# Patient Record
Sex: MALE | Race: WHITE | NOT HISPANIC OR LATINO | URBAN - METROPOLITAN AREA
[De-identification: names, ages, dates, MRNs, and addresses within clinical notes are randomized per-mention and may not be internally consistent; named-entity substitution may affect disease eponyms.]

---

## 2017-04-27 ENCOUNTER — ALLSCRIPTS OFFICE VISIT (OUTPATIENT)
Dept: OTHER | Facility: OTHER | Age: 57
End: 2017-04-27

## 2017-05-24 ENCOUNTER — ALLSCRIPTS OFFICE VISIT (OUTPATIENT)
Dept: OTHER | Facility: OTHER | Age: 57
End: 2017-05-24

## 2017-05-24 ENCOUNTER — HOSPITAL ENCOUNTER (OUTPATIENT)
Dept: RADIOLOGY | Facility: CLINIC | Age: 57
Discharge: HOME | End: 2017-05-24
Payer: COMMERCIAL

## 2017-05-24 DIAGNOSIS — M75.81 OTHER SHOULDER LESIONS, RIGHT SHOULDER: ICD-10-CM

## 2017-05-24 DIAGNOSIS — M25.519 PAIN IN SHOULDER: ICD-10-CM

## 2017-05-24 PROCEDURE — 73030 X-RAY EXAM OF SHOULDER: CPT

## 2017-10-26 ENCOUNTER — ALLSCRIPTS OFFICE VISIT (OUTPATIENT)
Dept: OTHER | Facility: OTHER | Age: 57
End: 2017-10-26

## 2017-10-27 NOTE — PROGRESS NOTES
Assessment  1  Depression (311) (F32 9)  2  Gastroesophageal reflux disease with esophagitis (530 11) (K21 0)  3  Allergic rhinitis, seasonal (477 9) (J30 2)    Plan  Depression    · Renew: ClonazePAM 0 5 MG Oral Tablet; take 1 tablet by mouth twice a day  Gastroesophageal reflux disease with esophagitis    · Renew: Omeprazole 40 MG Oral Capsule Delayed Release; take 1 capsule by mouth  once daily  Influenza vaccine needed    · Administer: Fluzone Quadrivalent Intramuscular Suspension; INJECT 0 5  ML  Intramuscular; To Be Done: [id number]      1 Amended By: Cristin Ying; Oct 26 2017 11:26 PM EST    Discussion/Summary    The patient is a 51-year-old male here for follow-up of anxiety and depression as well as gastroesophageal reflux disease  He feels that his symptoms haven't dramatically improved on Cymbalta  He also feels that it's helped for his chronic musculoskeletal pain  We will only increase his Cymbalta to 60 mg daily  He'll continue using clonazepam as needed  He'll continue with omeprazole for gastroesophageal reflux disease and loratadine for his allergic rhinitis  We'll see him back in 6 months or sooner as needed  He agrees  Chief Complaint  Patient is here today for follow up of chronic conditions described in HPI  History of Present Illness  The patient is a 51-year-old male here for follow-up of anxiety, depression as well as gastroesophageal reflux disease and allergic rhinitis  He was switched from Wellbutrin to Cymbalta  He states that since the switch she's feeling much better  Not only is his mood improved but his chronic musculoskeletal discomfort has significantly improved  He's been sleeping well and his anxiety level has improved as well  He does use Klonopin half a milligram in the morning and 1 mg in the evening for relief of his anxiety  His gastroesophageal reflux disease remains well-controlled and he takes his omeprazole daily   He's had no dysphagia or other worrisome symptoms  Allergies have been under fairly good control and continues with his loratadine daily  The patient is being seen for follow-up of gastroesophageal reflux disease  The patient reports doing well  Interval symptoms:  improved heartburn,-- denies chest pain,-- denies abdominal pain,-- improved acid regurgitation-- and-- denies dysphagia  Medications include omeprazole (Prilosec)  The patient states his depression has improved since the last visit  They have had recurrent episodes of major depression  Interval Symptoms: improved depression,-- improved depressed mood,-- improved loss of interest or pleasure in activities,-- denies insomnia,-- denies excessive sleepiness-- and-- improved loss of energy  Associated symptoms include: no recent weight gain,-- no recent weight loss-- and-- no thoughts of suicide  Review of Systems    Constitutional: no recent weight gain-- and-- no recent weight loss  Cardiovascular: No complaints of slow heart rate, no fast heart rate, no chest pain, no palpitations, no leg claudication, no lower extremity  Respiratory: No complaints of shortness of breath, no wheezing, no cough, no SOB on exertion, no orthopnea or PND  Gastrointestinal: as noted in HPI,-- no abdominal pain,-- no constipation-- and-- no diarrhea  Neurological: no headache-- and-- no dizziness  Psychiatric: anxiety-- and-- depression, but-- not suicidal       Active Problems  1  Actinic keratosis (702 0) (L57 0)  2  Allergic rhinitis, seasonal (477 9) (J30 2)  3  Degenerative arthritis of finger, right (715 94) (M19 041)  4  Depression (311) (F32 9)  5  Essential hypertriglyceridemia (272 1) (E78 1)  6  Gastroesophageal reflux disease with esophagitis (530 11) (K21 0)  7  Rotator cuff syndrome of right shoulder (726 10) (M75 101)    Social History   · Former smoker (H74 02) (O79 031)    Current Meds  1  ClonazePAM 0 5 MG Oral Tablet; take 1 tablet by mouth twice a day;    Therapy: 10ICH0947 to (Val Babb)  Requested for: 17YLB4610; Last   Rx:[id number] Ordered  2  DULoxetine HCl - 30 MG Oral Capsule Delayed Release Particles; TAKE 1 CAPSULE   Daily; Therapy: 16EDG1405 to (Evaluate:00Zxr6760)  Requested for: 66PLL2094; Last   Rx:[id number] Ordered  3  Loratadine 10 MG Oral Tablet; TAKE 1 TABLET DAILY; Therapy: 00Nyu4991 to (Last Rx:11Aug2016) Ordered  4  Omeprazole 40 MG Oral Capsule Delayed Release; take 1 capsule by mouth once daily    Requested for: 37Zfd5883; Last Rx:[id number] Ordered    Allergies  1  No Known Drug Allergies    Vitals  Vital Signs    Recorded: 93LWP9237 73:04HS   Systolic 808   Diastolic 82   Height 5 ft 8 5 in   Weight 201 lb    BMI Calculated 30 12   BSA Calculated 2 06     Physical Exam    Constitutional   General appearance: Abnormal  -- Overweight in no apparent distress  Pulmonary   Respiratory effort: No increased work of breathing or signs of respiratory distress  Auscultation of lungs: Clear to auscultation, equal breath sounds bilaterally, no wheezes, no rales, no rhonci  -- Clear to auscultation  Cardiovascular   Auscultation of heart: Normal rate and rhythm, normal S1 and S2, without murmurs  -- Regular rhythm with a rate in the 70s  Examination of extremities for edema and/or varicosities: Normal  -- No edema  Lymphatic   Palpation of lymph nodes in neck: No lymphadenopathy  -- No adenopathy  Musculoskeletal   Digits and nails: Normal without clubbing or cyanosis  -- No clubbing cyanosis or edema  Psychiatric   Orientation to person, place and time: Normal     Mood and affect: Normal          Future Appointments    Date/Time Provider Specialty Site   04/26/2018 04:00 PM PEREZ Regalado   Family Medicine [address] [place] [address]     Signatures   Electronically signed by : PEREZ Miller ; Oct 26 2017 11:24PM EST                       (Author)    Electronically signed by : PEREZ Miller ; Oct 26 2017 11:26PM EST (Author)

## 2018-01-12 VITALS
DIASTOLIC BLOOD PRESSURE: 82 MMHG | SYSTOLIC BLOOD PRESSURE: 130 MMHG | HEIGHT: 69 IN | BODY MASS INDEX: 29.77 KG/M2 | WEIGHT: 201 LBS

## 2018-01-12 VITALS
BODY MASS INDEX: 30.36 KG/M2 | SYSTOLIC BLOOD PRESSURE: 150 MMHG | WEIGHT: 205 LBS | TEMPERATURE: 97.7 F | DIASTOLIC BLOOD PRESSURE: 80 MMHG | HEIGHT: 69 IN

## 2018-01-12 NOTE — RESULT NOTES
Verified Results  COLONOSCOPY [id number] 12:00AM Aparnascarlet Quiñonestles     Test Name Result Flag Reference   Colonoscopy 02/17/2016

## 2018-01-14 VITALS
WEIGHT: 201.38 LBS | HEART RATE: 82 BPM | SYSTOLIC BLOOD PRESSURE: 129 MMHG | BODY MASS INDEX: 29.83 KG/M2 | HEIGHT: 69 IN | DIASTOLIC BLOOD PRESSURE: 76 MMHG

## 2018-01-18 NOTE — RESULT NOTES
Message   Please told him that overall his blood work looks good  His total cholesterol looks good as well as his LDL which is his bad cholesterol  His HDL/good cholesterol is a little bit low he should try to increase his aerobic exercise  Triglycerides/blood fat were somewhat elevated he should watch his diet for fatty food sugar etc  His blood sugar was normal      Verified Results  (1) LIPID PANEL, FASTING 15Sep2016 12:00AM Brandyn De Anda     Test Name Result Flag Reference   CHOLESTEROL, TOTAL 193 mg/dL  125-200   HDL CHOLESTEROL 37 mg/dL L > OR = 40   TRIGLICERIDES 780 mg/dL H <150   LDL-CHOLESTEROL 96 mg/dL (calc)  <130   Desirable range <100 mg/dL for patients with CHD or  diabetes and <70 mg/dL for diabetic patients with  known heart disease  CHOL/HDLC RATIO 5 2 (calc) H < OR = 5 0   NON HDL CHOLESTEROL 156 mg/dL (calc)     Target for non-HDL cholesterol is 30 mg/dL higher than   LDL cholesterol target  (1) COMPREHENSIVE METABOLIC PANEL [id number] 39:88YW Atif Baumann     Test Name Result Flag Reference   GLUCOSE 88 mg/dL  65-99   Fasting reference interval   UREA NITROGEN (BUN) 14 mg/dL  7-25   CREATININE 1 03 mg/dL  0 70-1 33   For patients >52years of age, the reference limit  for Creatinine is approximately 13% higher for people  identified as -American  eGFR NON-AFR   AMERICAN 81 mL/min/1 73m2  > OR = 60   eGFR AFRICAN AMERICAN 94 mL/min/1 73m2  > OR = 60   BUN/CREATININE RATIO   2-28   NOT APPLICABLE (calc)   SODIUM 140 mmol/L  135-146   POTASSIUM 4 6 mmol/L  3 5-5 3   CHLORIDE 105 mmol/L     CARBON DIOXIDE 27 mmol/L  20-31   CALCIUM 9 6 mg/dL  8 6-10 3   PROTEIN, TOTAL 7 0 g/dL  6 1-8 1   ALBUMIN 4 9 g/dL  3 6-5 1   GLOBULIN 2 1 g/dL (calc)  1 9-3 7   ALBUMIN/GLOBULIN RATIO 2 3 (calc)  1 0-2 5   BILIRUBIN, TOTAL 0 7 mg/dL  0 2-1 2   ALKALINE PHOSPHATASE 91 U/L     AST 19 U/L  10-35   ALT 29 U/L  9-46     (Q) CBC (H/H, RBC, INDICES, WBC, PLT) 15Sep2016 12:00AM Atif Baumann Test Name Result Flag Reference   WHITE BLOOD CELL COUNT 5 3 Thousand/uL  3 8-10 8   RED BLOOD CELL COUNT 4 71 Million/uL  4 20-5 80   HEMOGLOBIN 14 3 g/dL  13 2-17 1   HEMATOCRIT 42 1 %  38 5-50 0   MCV 89 3 fL  80 0-100 0   MCH 30 3 pg  27 0-33 0   MCHC 33 9 g/dL  32 0-36 0   RDW 13 2 %  11 0-15 0   PLATELET COUNT 307 Thousand/uL  140-400   MPV 7 9 fL  7 5-11 5     (Q) TSH, 3RD GENERATION [id number] 12:00AM Brandyn De Anda     Test Name Result Flag Reference   TSH 1 41 mIU/L  0 40-4 50

## 2018-04-26 ENCOUNTER — OFFICE VISIT (OUTPATIENT)
Dept: FAMILY MEDICINE CLINIC | Facility: CLINIC | Age: 58
End: 2018-04-26
Payer: COMMERCIAL

## 2018-04-26 VITALS
WEIGHT: 207 LBS | BODY MASS INDEX: 30.66 KG/M2 | DIASTOLIC BLOOD PRESSURE: 82 MMHG | HEIGHT: 69 IN | SYSTOLIC BLOOD PRESSURE: 128 MMHG

## 2018-04-26 DIAGNOSIS — F41.9 ANXIETY: ICD-10-CM

## 2018-04-26 DIAGNOSIS — F33.42 RECURRENT MAJOR DEPRESSIVE DISORDER, IN FULL REMISSION (HCC): Primary | ICD-10-CM

## 2018-04-26 DIAGNOSIS — K21.00 GASTROESOPHAGEAL REFLUX DISEASE WITH ESOPHAGITIS: ICD-10-CM

## 2018-04-26 PROBLEM — M75.101 ROTATOR CUFF SYNDROME OF RIGHT SHOULDER: Status: ACTIVE | Noted: 2017-04-27

## 2018-04-26 PROCEDURE — 99214 OFFICE O/P EST MOD 30 MIN: CPT | Performed by: FAMILY MEDICINE

## 2018-04-26 PROCEDURE — 3008F BODY MASS INDEX DOCD: CPT | Performed by: FAMILY MEDICINE

## 2018-04-26 RX ORDER — OMEPRAZOLE 40 MG/1
40 CAPSULE, DELAYED RELEASE ORAL DAILY
Qty: 30 CAPSULE | Refills: 5 | Status: SHIPPED | OUTPATIENT
Start: 2018-04-26 | End: 2018-11-01 | Stop reason: SDUPTHER

## 2018-04-26 RX ORDER — CLONAZEPAM 0.5 MG/1
1 TABLET ORAL 2 TIMES DAILY
COMMUNITY
Start: 2017-05-12 | End: 2018-04-26 | Stop reason: SDUPTHER

## 2018-04-26 RX ORDER — OMEPRAZOLE 40 MG/1
1 CAPSULE, DELAYED RELEASE ORAL DAILY
COMMUNITY
End: 2018-04-26 | Stop reason: SDUPTHER

## 2018-04-26 RX ORDER — CLONAZEPAM 0.5 MG/1
0.5 TABLET ORAL 2 TIMES DAILY
Qty: 60 TABLET | Refills: 5 | Status: SHIPPED | OUTPATIENT
Start: 2018-04-26 | End: 2018-11-01 | Stop reason: SDUPTHER

## 2018-04-26 RX ORDER — DULOXETIN HYDROCHLORIDE 60 MG/1
1 CAPSULE, DELAYED RELEASE ORAL DAILY
COMMUNITY
Start: 2017-04-27 | End: 2018-04-26 | Stop reason: SDUPTHER

## 2018-04-26 RX ORDER — DULOXETIN HYDROCHLORIDE 60 MG/1
60 CAPSULE, DELAYED RELEASE ORAL DAILY
Qty: 30 CAPSULE | Refills: 5 | Status: SHIPPED | OUTPATIENT
Start: 2018-04-26 | End: 2018-11-01 | Stop reason: SDUPTHER

## 2018-04-26 RX ORDER — BUPROPION HYDROCHLORIDE 75 MG/1
TABLET ORAL
COMMUNITY
Start: 2017-04-13 | End: 2018-04-26 | Stop reason: ALTCHOICE

## 2018-04-26 RX ORDER — LORATADINE 10 MG/1
1 TABLET ORAL AS NEEDED
COMMUNITY
Start: 2016-08-11

## 2018-04-26 NOTE — ASSESSMENT & PLAN NOTE
GERD symptoms are well controlled with omeprazole  He will continue it as if he tries to wean he has noted recurrence of symptoms

## 2018-04-26 NOTE — PROGRESS NOTES
Assessment/Plan:  Depression  He is doing well Cymbalta  Going to continue same  Anxiety  Anxiety well controlled with Klonopin which he will continue  Gastroesophageal reflux disease with esophagitis  GERD symptoms are well controlled with omeprazole  He will continue it as if he tries to wean he has noted recurrence of symptoms  Diagnoses and all orders for this visit:    Recurrent major depressive disorder, in full remission ([facility] [address] )  -     DULoxetine (CYMBALTA) 60 mg delayed release capsule; Take 1 capsule (60 mg total) by mouth daily    Gastroesophageal reflux disease with esophagitis  -     omeprazole (PriLOSEC) 40 MG capsule; Take 1 capsule (40 mg total) by mouth daily    Anxiety  -     clonazePAM (KlonoPIN) 0 5 mg tablet; Take 1 tablet (0 5 mg total) by mouth 2 (two) times a day    Other orders  -     Discontinue: buPROPion (WELLBUTRIN) 75 mg tablet;   -     Discontinue: clonazePAM (KlonoPIN) 0 5 mg tablet; Take 1 tablet by mouth 2 (two) times a day  -     Discontinue: DULoxetine (CYMBALTA) 60 mg delayed release capsule; Take 1 capsule by mouth daily  -     loratadine (CLARITIN) 10 mg tablet; Take 1 tablet by mouth daily  -     Discontinue: omeprazole (PriLOSEC) 40 MG capsule; Take 1 capsule by mouth daily          Subjective:   Chief Complaint   Patient presents with    Follow-up     6 Mo Med Check        Patient ID: Nicole Hernandez is a 62 y o  male  Things are going well  Cymbalta is feeling well  Mood is good  Reflux recurs if stops  Uses Klonopin  1/2 am and full PM  C/w Cymbalta  No SE  The best I have felt in years and years  Sleep good, interest good, energy level +/-, concentration good, appetite good  No suicidal ideation  HPI  The patient is a 71-year-old male here for follow-up of multiple medical problems including anxiety and depression as well as gastroesophageal reflux disease  He states that he has been doing well since his last visit  Cymbalta has worked well for him and he states that this is the best he has felt in many years  He continues to use Klonopin a half of a 0 5 mg tablet 1st thing in the morning and then 1 after he returns from work for the day  States his anxiety level has improved overall  He continues with omeprazole for gastroesophageal reflux disease  It works well his symptoms are well controlled he takes it but if he tries to discontinue it he notes that symptoms recur  He has had no dysphagia or other concerning symptoms such as weight loss night sweats X cetera  The following portions of the patient's history were reviewed and updated as appropriate: allergies, past family history, past medical history, past social history, past surgical history and problem list     Review of Systems   Constitution: Negative for decreased appetite and weight loss  Eyes: Negative for double vision  Cardiovascular: Negative for chest pain, leg swelling, orthopnea and paroxysmal nocturnal dyspnea  Gastrointestinal: Negative for constipation, diarrhea, dysphagia and hematemesis  Genitourinary: Positive for nocturia  Negative for dysuria  Urinary slowing      Neurological: Negative for headaches and light-headedness  Psychiatric/Behavioral: Positive for depression  The patient is nervous/anxious  The patient does not have insomnia  Objective:    Physical Exam   Constitutional: He is oriented to person, place, and time  He appears well-developed and well-nourished  Overweight in no apparent distress   Neck: No thyromegaly present  Cardiovascular: Normal rate and regular rhythm  Pulmonary/Chest: Effort normal and breath sounds normal    Abdominal: Soft  Bowel sounds are normal    Neurological: He is alert and oriented to person, place, and time  Psychiatric: He has a normal mood and affect  His behavior is normal    Does not bear anxious nor depressed today

## 2018-07-02 ENCOUNTER — OFFICE VISIT (OUTPATIENT)
Dept: FAMILY MEDICINE CLINIC | Facility: CLINIC | Age: 58
End: 2018-07-02
Payer: COMMERCIAL

## 2018-07-02 VITALS
DIASTOLIC BLOOD PRESSURE: 70 MMHG | BODY MASS INDEX: 30.35 KG/M2 | SYSTOLIC BLOOD PRESSURE: 120 MMHG | WEIGHT: 207 LBS | TEMPERATURE: 98.1 F

## 2018-07-02 DIAGNOSIS — M70.41 PREPATELLAR BURSITIS OF RIGHT KNEE: Primary | ICD-10-CM

## 2018-07-02 PROCEDURE — 99214 OFFICE O/P EST MOD 30 MIN: CPT | Performed by: FAMILY MEDICINE

## 2018-07-02 RX ORDER — DOXYCYCLINE HYCLATE 100 MG/1
100 CAPSULE ORAL EVERY 12 HOURS SCHEDULED
Qty: 20 CAPSULE | Refills: 1 | Status: SHIPPED | OUTPATIENT
Start: 2018-07-02 | End: 2018-07-12

## 2018-07-02 NOTE — ASSESSMENT & PLAN NOTE
The patient appears to have a right prepatellar bursitis  There is no fluctuance  There is no drainage  There is no knee effusion  This may be inflammatory in nature though could not rule out infectious  I do not believe it is drainable  We asked patient to use warm compresses and we are going to start him on doxycycline 100 milligrams b i d  for 10 days with 1 refill  He is asked to call in 3 days if he does not see significant improvement in the knee  Will call sooner as needed  He agrees

## 2018-07-02 NOTE — PATIENT INSTRUCTIONS
Please use warm compresses to the area  Please take all of the doxycycline  Please take precautions while in the sun to avoid sun exposure

## 2018-07-02 NOTE — PROGRESS NOTES
Assessment/Plan:  Prepatellar bursitis of right knee  The patient appears to have a right prepatellar bursitis  There is no fluctuance  There is no drainage  There is no knee effusion  This may be inflammatory in nature though could not rule out infectious  I do not believe it is drainable  We asked patient to use warm compresses and we are going to start him on doxycycline 100 milligrams b i d  for 10 days with 1 refill  He is asked to call in 3 days if he does not see significant improvement in the knee  Will call sooner as needed  He agrees  Diagnoses and all orders for this visit:    Prepatellar bursitis of right knee  -     doxycycline hyclate (VIBRAMYCIN) 100 mg capsule; Take 1 capsule (100 mg total) by mouth every 12 (twelve) hours for 10 days          Subjective:   Chief Complaint   Patient presents with    Insect Bite     1 week ago tomorrow woke up with red swollen right knee  Patient ID: Anusha Daniels is a 62 y o  male  HPI  The patient is a 61-year-old male [place] who states that approximately 1 week ago he woke up with uncomfortable swollen right knee  He is concerned that it may have been a tick bite due to the circular nature of the affected area though he recalls seeing no tick  He has had no headache neck pain fevers chills or constitutional symptoms  He has no limitation of range of motion in the knee  He has no other joints affected  He has no rapid or irregular heartbeats  His father was recently treated for Lyme disease and had a difficult time he is significantly concerned about the possibility that he may have Lyme disease  The following portions of the patient's history were reviewed and updated as appropriate: allergies, current medications, past family history, past medical history, past social history and problem list     Review of Systems   Constitution: Negative for decreased appetite, fever and malaise/fatigue  Cardiovascular: Negative  Negative for irregular heartbeat, leg swelling and palpitations  Respiratory: Negative  Musculoskeletal: Positive for joint swelling  Negative for neck pain  Swelling and warmth of the right prepatellar region   Gastrointestinal: Negative  Genitourinary: Negative  Neurological: Negative for dizziness and headaches  Psychiatric/Behavioral: Negative for altered mental status and depression  The patient does not have insomnia and is not nervous/anxious  Objective:    Physical Exam   Constitutional: He is oriented to person, place, and time  He appears well-developed and well-nourished  Neck: Neck supple  No JVD present  No thyromegaly present  Cardiovascular: Normal rate, regular rhythm and normal heart sounds  No murmur heard  Pulmonary/Chest: Effort normal and breath sounds normal  No respiratory distress  He has no wheezes  Musculoskeletal: He exhibits tenderness  He exhibits no edema  There is erythema and induration of the right prepatellar region especially the mid inferior pole  There is no drainage  Range of motion in the is normal and there is no knee effusion  Lymphadenopathy:     He has no cervical adenopathy  Neurological: He is alert and oriented to person, place, and time  Skin: There is erythema  There is erythema and warmth of the right prepatellar region  There is some induration of the mid and lower pole of the prepatellar region  There does appear to be what may be a pinpoint entry area in the center of this region  There is no foreign body noted  Cannot state that this is an insect bite  There is no ECM

## 2018-11-01 ENCOUNTER — OFFICE VISIT (OUTPATIENT)
Dept: FAMILY MEDICINE CLINIC | Facility: CLINIC | Age: 58
End: 2018-11-01
Payer: COMMERCIAL

## 2018-11-01 VITALS
TEMPERATURE: 98.1 F | SYSTOLIC BLOOD PRESSURE: 126 MMHG | BODY MASS INDEX: 30.35 KG/M2 | OXYGEN SATURATION: 96 % | DIASTOLIC BLOOD PRESSURE: 82 MMHG | HEART RATE: 95 BPM | WEIGHT: 207 LBS

## 2018-11-01 DIAGNOSIS — Z23 NEED FOR INFLUENZA VACCINATION: ICD-10-CM

## 2018-11-01 DIAGNOSIS — F41.9 ANXIETY: ICD-10-CM

## 2018-11-01 DIAGNOSIS — J30.1 SEASONAL ALLERGIC RHINITIS DUE TO POLLEN: ICD-10-CM

## 2018-11-01 DIAGNOSIS — K21.00 GASTROESOPHAGEAL REFLUX DISEASE WITH ESOPHAGITIS: ICD-10-CM

## 2018-11-01 DIAGNOSIS — E78.1 ESSENTIAL HYPERTRIGLYCERIDEMIA: ICD-10-CM

## 2018-11-01 DIAGNOSIS — F33.42 RECURRENT MAJOR DEPRESSIVE DISORDER, IN FULL REMISSION (HCC): Primary | ICD-10-CM

## 2018-11-01 PROCEDURE — 90471 IMMUNIZATION ADMIN: CPT

## 2018-11-01 PROCEDURE — 1036F TOBACCO NON-USER: CPT | Performed by: FAMILY MEDICINE

## 2018-11-01 PROCEDURE — 90682 RIV4 VACC RECOMBINANT DNA IM: CPT

## 2018-11-01 PROCEDURE — 99214 OFFICE O/P EST MOD 30 MIN: CPT | Performed by: FAMILY MEDICINE

## 2018-11-01 RX ORDER — OMEPRAZOLE 40 MG/1
40 CAPSULE, DELAYED RELEASE ORAL DAILY
Qty: 30 CAPSULE | Refills: 5 | Status: SHIPPED | OUTPATIENT
Start: 2018-11-01 | End: 2019-04-26 | Stop reason: SDUPTHER

## 2018-11-01 RX ORDER — DULOXETIN HYDROCHLORIDE 60 MG/1
60 CAPSULE, DELAYED RELEASE ORAL DAILY
Qty: 30 CAPSULE | Refills: 5 | Status: SHIPPED | OUTPATIENT
Start: 2018-11-01 | End: 2019-04-26 | Stop reason: SDUPTHER

## 2018-11-01 RX ORDER — CLONAZEPAM 0.5 MG/1
0.5 TABLET ORAL 2 TIMES DAILY
Qty: 60 TABLET | Refills: 5 | Status: SHIPPED | OUTPATIENT
Start: 2018-11-01 | End: 2019-04-26 | Stop reason: SDUPTHER

## 2018-11-01 NOTE — PROGRESS NOTES
Assessment/Plan:  Gastroesophageal reflux disease with esophagitis  Continue with omeprazole    Allergic rhinitis, seasonal  Continue with loratadine    Depression  Continue with duloxetine  Well controlled    Anxiety  Continue with clonazepam     Essential hypertriglyceridemia  He is overdue for blood work and he will have fasting lipids performed  Return for fasting blood work to include CBC CMP lipids and TSH     Diagnoses and all orders for this visit:    Recurrent major depressive disorder, in full remission ([facility] [address] )  -     DULoxetine (CYMBALTA) 60 mg delayed release capsule; Take 1 capsule (60 mg total) by mouth daily    Anxiety  -     clonazePAM (KlonoPIN) 0 5 mg tablet; Take 1 tablet (0 5 mg total) by mouth 2 (two) times a day    Gastroesophageal reflux disease with esophagitis  -     omeprazole (PriLOSEC) 40 MG capsule; Take 1 capsule (40 mg total) by mouth daily    Seasonal allergic rhinitis due to pollen    Need for influenza vaccination  -     influenza vaccine, [id number], quadrivalent, recombinant, PF, 0 5 mL, for patients 18 yr+ (FLUBLOK)    Essential hypertriglyceridemia          Subjective:   Chief Complaint   Patient presents with    med check     here for his 6 mth med refills  he will get his flu shot today  he will schedule for fbw  last colon was 2/16   Daughter doing great but wife has breast cancer  Seems to be feeling well  " Duloxetine works terrific "  I dont hurt as much  Reflux symptoms up and down  C/w omeprazole  No dysphagia  AR sx  Not too bad  Takes loratadine  Klonopin BID  Sleep good as well as interest, energy good, concentration OK, appetite good  No suicidal ideation  Patient ID: Power Choudhury is a 62 y o  male  HPI  Patient is a 49-year-old male a history of anxiety and depression as well as gastroesophageal reflux disease and allergic rhinitis  He states that overall he is feeling well    He feels that his depression and anxiety are very well controlled with the duloxetine and Klonopin  He states that his wife was recently diagnosed with triple negative breast cancer  He has been able handle the news well per his assessment  His daughters lupus has been much improved which he is happy about  He does need to continue to take his omeprazole  He states his reflux symptoms wax and wane even with regular use of 40 mg of omeprazole  He does not get dysphagia anorexia weight loss night sweats X cetera  His allergic rhinitis is fairly well controlled as long as he takes loratadine  States he has been sleeping well, interest level is good, energy is good as well as concentration  He has no issues with his appetite he has no suicidal ideation  The following portions of the patient's history were reviewed and updated as appropriate: allergies, current medications, past family history, past medical history, past social history, past surgical history and problem list     Review of Systems   Constitution: Negative for chills, decreased appetite, fever, malaise/fatigue, weight gain and weight loss  HENT: Negative for congestion  Cardiovascular: Negative for chest pain, dyspnea on exertion, leg swelling, orthopnea and paroxysmal nocturnal dyspnea  Respiratory: Negative for cough, shortness of breath, sputum production and wheezing  Endocrine: Negative for cold intolerance, heat intolerance, polydipsia, polyphagia and polyuria  Gastrointestinal: Positive for heartburn  Negative for abdominal pain, bowel incontinence, constipation, diarrhea and dysphagia  Genitourinary: Negative for bladder incontinence, frequency and hematuria  Neurological: Negative for dizziness, headaches and light-headedness  Psychiatric/Behavioral: Positive for depression  Negative for suicidal ideas  The patient is nervous/anxious  The patient does not have insomnia  Objective:    Physical Exam   Constitutional: He is oriented to person, place, and time   He appears well-developed and well-nourished  No distress  Neck: Neck supple  No JVD present  No thyromegaly present  Cardiovascular: Normal rate, regular rhythm and normal heart sounds  Exam reveals no gallop  No murmur heard  Pulmonary/Chest: Effort normal and breath sounds normal  No respiratory distress  He has no wheezes  He has no rales  Musculoskeletal: He exhibits no edema  Neurological: He is alert and oriented to person, place, and time  Psychiatric: He has a normal mood and affect   His behavior is normal  Thought content normal

## 2018-12-21 ENCOUNTER — CLINICAL SUPPORT (OUTPATIENT)
Dept: FAMILY MEDICINE CLINIC | Facility: CLINIC | Age: 58
End: 2018-12-21
Payer: COMMERCIAL

## 2018-12-21 DIAGNOSIS — Z11.59 NEED FOR HEPATITIS C SCREENING TEST: Primary | ICD-10-CM

## 2018-12-21 DIAGNOSIS — Z13.1 SCREENING FOR DIABETES MELLITUS: ICD-10-CM

## 2018-12-21 DIAGNOSIS — E78.1 ESSENTIAL HYPERTRIGLYCERIDEMIA: ICD-10-CM

## 2018-12-21 DIAGNOSIS — F33.42 RECURRENT MAJOR DEPRESSIVE DISORDER, IN FULL REMISSION (HCC): ICD-10-CM

## 2018-12-21 DIAGNOSIS — R53.83 FATIGUE, UNSPECIFIED TYPE: ICD-10-CM

## 2018-12-21 PROCEDURE — 36415 COLL VENOUS BLD VENIPUNCTURE: CPT

## 2018-12-24 LAB
ALBUMIN SERPL-MCNC: 4.3 G/DL (ref 3.6–5.1)
ALBUMIN/GLOB SERPL: 2.4 (CALC) (ref 1–2.5)
ALP SERPL-CCNC: 98 U/L (ref 40–115)
ALT SERPL-CCNC: 40 U/L (ref 9–46)
AST SERPL-CCNC: 20 U/L (ref 10–35)
B BURGDOR AB SER IA-ACNC: <0.9 INDEX
BILIRUB SERPL-MCNC: 0.4 MG/DL (ref 0.2–1.2)
BUN SERPL-MCNC: 17 MG/DL (ref 7–25)
BUN/CREAT SERPL: ABNORMAL (CALC) (ref 6–22)
CALCIUM SERPL-MCNC: 8.9 MG/DL (ref 8.6–10.3)
CHLORIDE SERPL-SCNC: 107 MMOL/L (ref 98–110)
CHOLEST SERPL-MCNC: 183 MG/DL
CHOLEST/HDLC SERPL: 5.2 (CALC)
CO2 SERPL-SCNC: 25 MMOL/L (ref 20–32)
CREAT SERPL-MCNC: 0.88 MG/DL (ref 0.7–1.33)
ERYTHROCYTE [DISTWIDTH] IN BLOOD BY AUTOMATED COUNT: 13 % (ref 11–15)
GLOBULIN SER CALC-MCNC: 1.8 G/DL (CALC) (ref 1.9–3.7)
GLUCOSE SERPL-MCNC: 98 MG/DL (ref 65–99)
HCT VFR BLD AUTO: 41.6 % (ref 38.5–50)
HCV AB S/CO SERPL IA: 0.01
HCV AB SERPL QL IA: NORMAL
HDLC SERPL-MCNC: 35 MG/DL
HGB BLD-MCNC: 14 G/DL (ref 13.2–17.1)
LDLC SERPL CALC-MCNC: 116 MG/DL (CALC)
MCH RBC QN AUTO: 30.4 PG (ref 27–33)
MCHC RBC AUTO-ENTMCNC: 33.7 G/DL (ref 32–36)
MCV RBC AUTO: 90.2 FL (ref 80–100)
NONHDLC SERPL-MCNC: 148 MG/DL (CALC)
PLATELET # BLD AUTO: 263 THOUSAND/UL (ref 140–400)
PMV BLD REES-ECKER: 9.7 FL (ref 7.5–12.5)
POTASSIUM SERPL-SCNC: 4.4 MMOL/L (ref 3.5–5.3)
PROT SERPL-MCNC: 6.1 G/DL (ref 6.1–8.1)
RBC # BLD AUTO: 4.61 MILLION/UL (ref 4.2–5.8)
SL AMB EGFR AFRICAN AMERICAN: 110 ML/MIN/1.73M2
SL AMB EGFR NON AFRICAN AMERICAN: 95 ML/MIN/1.73M2
SODIUM SERPL-SCNC: 141 MMOL/L (ref 135–146)
TRIGL SERPL-MCNC: 208 MG/DL
TSH SERPL-ACNC: 0.88 MIU/L (ref 0.4–4.5)
WBC # BLD AUTO: 4.4 THOUSAND/UL (ref 3.8–10.8)

## 2019-01-31 ENCOUNTER — OFFICE VISIT (OUTPATIENT)
Dept: FAMILY MEDICINE CLINIC | Facility: CLINIC | Age: 59
End: 2019-01-31
Payer: COMMERCIAL

## 2019-01-31 VITALS
HEIGHT: 69 IN | DIASTOLIC BLOOD PRESSURE: 78 MMHG | BODY MASS INDEX: 31.4 KG/M2 | HEART RATE: 72 BPM | TEMPERATURE: 96.8 F | SYSTOLIC BLOOD PRESSURE: 122 MMHG | OXYGEN SATURATION: 96 % | WEIGHT: 212 LBS

## 2019-01-31 DIAGNOSIS — L57.0 KERATOSIS: Primary | ICD-10-CM

## 2019-01-31 PROCEDURE — 99213 OFFICE O/P EST LOW 20 MIN: CPT | Performed by: FAMILY MEDICINE

## 2019-01-31 PROCEDURE — 3008F BODY MASS INDEX DOCD: CPT | Performed by: FAMILY MEDICINE

## 2019-01-31 PROCEDURE — 17000 DESTRUCT PREMALG LESION: CPT | Performed by: FAMILY MEDICINE

## 2019-01-31 PROCEDURE — 1036F TOBACCO NON-USER: CPT | Performed by: FAMILY MEDICINE

## 2019-01-31 NOTE — ASSESSMENT & PLAN NOTE
This keratotic lesion was treated with cryotherapy  I told the patient I believe that represents seborrheic keratosis but I am not 100% sure this  He is asked to observe for resolution in 2 weeks  If there is any remaining lesion at the end of this 2 week  He should come for re-evaluation  He agrees

## 2019-01-31 NOTE — PROGRESS NOTES
Assessment/Plan:  Keratosis  This keratotic lesion was treated with cryotherapy  I told the patient I believe that represents seborrheic keratosis but I am not 100% sure this  He is asked to observe for resolution in 2 weeks  If there is any remaining lesion at the end of this 2 week  He should come for re-evaluation  He agrees  Diagnoses and all orders for this visit:    Keratosis    Other orders  -     Lesion Destruction          Subjective:   Chief Complaint   Patient presents with    skin check     here to have a spot on his L shoulder checked  states it is black  Patient ID: Ekaterina Felix is a 62 y o  male  HPI  The patient is a 79-year-old male who presents today to have a lesion on his left shoulder checked  It is dark in coloration  He recently accompanied his wife to 1 of her oncology visits and PA told him that he should have it checked  He has no personal history of skin malignancy and he is unsure of family history  He states that his father brother both have had things frozen off but he is not aware of melanoma X cetera  He has had no constitutional symptoms such as fevers chills weight loss X cetera  The following portions of the patient's history were reviewed and updated as appropriate: allergies, current medications, past family history, past medical history, past social history, past surgical history and problem list     Review of Systems   Constitution: Negative for chills, decreased appetite, fever, weight gain and weight loss  Skin: Positive for color change and suspicious lesions  Negative for dry skin, itching and skin cancer  Objective:    Physical Exam   Constitutional: He is oriented to person, place, and time  He appears well-developed and well-nourished  Neurological: He is alert and oriented to person, place, and time  Skin:   Left scapular region, laterally with a 5 mm raised brown somewhat for verrucous/crusty appearing lesion    It is regular in color and borders  It appears to represent a seborrheic keratosis  Could not rule out actinic keratosis  Psychiatric: He has a normal mood and affect  Nursing note and vitals reviewed  Lesion Destruction  Date/Time: 1/31/2019 4:52 PM  Performed by: Juwan Coombs  Authorized by: Juwan Coombs     Procedure Details - Lesion Destruction:     Number of Lesions:  1  Lesion 1:     Body area:  Trunk    Trunk location:  Chest    Initial size (mm):  5    Malignancy: benign hyperkeratotic lesion      Destruction method: cryotherapy    Lesion 6:      Cryotherapy was used to treat this likely seborrheic keratosis times 45 seconds x2 freeze thaw cycles

## 2019-04-26 DIAGNOSIS — F41.9 ANXIETY: ICD-10-CM

## 2019-04-26 DIAGNOSIS — F33.42 RECURRENT MAJOR DEPRESSIVE DISORDER, IN FULL REMISSION (HCC): ICD-10-CM

## 2019-04-26 DIAGNOSIS — K21.00 GASTROESOPHAGEAL REFLUX DISEASE WITH ESOPHAGITIS: ICD-10-CM

## 2019-04-26 RX ORDER — OMEPRAZOLE 40 MG/1
40 CAPSULE, DELAYED RELEASE ORAL DAILY
Qty: 30 CAPSULE | Refills: 5 | Status: SHIPPED | OUTPATIENT
Start: 2019-04-26 | End: 2019-05-23 | Stop reason: SDUPTHER

## 2019-04-26 RX ORDER — CLONAZEPAM 0.5 MG/1
0.5 TABLET ORAL 2 TIMES DAILY
Qty: 60 TABLET | Refills: 5 | Status: SHIPPED | OUTPATIENT
Start: 2019-04-26 | End: 2019-05-23 | Stop reason: SDUPTHER

## 2019-04-26 RX ORDER — DULOXETIN HYDROCHLORIDE 60 MG/1
60 CAPSULE, DELAYED RELEASE ORAL DAILY
Qty: 30 CAPSULE | Refills: 5 | Status: SHIPPED | OUTPATIENT
Start: 2019-04-26 | End: 2019-05-23 | Stop reason: SDUPTHER

## 2019-05-23 ENCOUNTER — OFFICE VISIT (OUTPATIENT)
Dept: FAMILY MEDICINE CLINIC | Facility: CLINIC | Age: 59
End: 2019-05-23
Payer: COMMERCIAL

## 2019-05-23 VITALS
BODY MASS INDEX: 29.62 KG/M2 | HEART RATE: 78 BPM | HEIGHT: 69 IN | TEMPERATURE: 98 F | WEIGHT: 200 LBS | SYSTOLIC BLOOD PRESSURE: 115 MMHG | OXYGEN SATURATION: 97 % | DIASTOLIC BLOOD PRESSURE: 72 MMHG

## 2019-05-23 DIAGNOSIS — K21.00 GASTROESOPHAGEAL REFLUX DISEASE WITH ESOPHAGITIS: ICD-10-CM

## 2019-05-23 DIAGNOSIS — F41.9 ANXIETY: ICD-10-CM

## 2019-05-23 DIAGNOSIS — F33.42 RECURRENT MAJOR DEPRESSIVE DISORDER, IN FULL REMISSION (HCC): ICD-10-CM

## 2019-05-23 PROCEDURE — 99214 OFFICE O/P EST MOD 30 MIN: CPT | Performed by: FAMILY MEDICINE

## 2019-05-23 PROCEDURE — 3008F BODY MASS INDEX DOCD: CPT | Performed by: FAMILY MEDICINE

## 2019-05-23 PROCEDURE — 1036F TOBACCO NON-USER: CPT | Performed by: FAMILY MEDICINE

## 2019-05-23 RX ORDER — CLONAZEPAM 0.5 MG/1
0.5 TABLET ORAL 2 TIMES DAILY
Qty: 60 TABLET | Refills: 5 | Status: SHIPPED | OUTPATIENT
Start: 2019-05-23 | End: 2019-11-19 | Stop reason: SDUPTHER

## 2019-05-23 RX ORDER — DULOXETIN HYDROCHLORIDE 60 MG/1
60 CAPSULE, DELAYED RELEASE ORAL DAILY
Qty: 30 CAPSULE | Refills: 5 | Status: SHIPPED | OUTPATIENT
Start: 2019-05-23 | End: 2019-11-19 | Stop reason: SDUPTHER

## 2019-05-23 RX ORDER — OMEPRAZOLE 40 MG/1
40 CAPSULE, DELAYED RELEASE ORAL DAILY
Qty: 30 CAPSULE | Refills: 5 | Status: SHIPPED | OUTPATIENT
Start: 2019-05-23 | End: 2019-11-19 | Stop reason: SDUPTHER

## 2019-11-19 DIAGNOSIS — F41.9 ANXIETY: ICD-10-CM

## 2019-11-19 DIAGNOSIS — F33.42 RECURRENT MAJOR DEPRESSIVE DISORDER, IN FULL REMISSION (HCC): ICD-10-CM

## 2019-11-19 DIAGNOSIS — K21.00 GASTROESOPHAGEAL REFLUX DISEASE WITH ESOPHAGITIS: ICD-10-CM

## 2019-11-19 RX ORDER — DULOXETIN HYDROCHLORIDE 60 MG/1
60 CAPSULE, DELAYED RELEASE ORAL DAILY
Qty: 30 CAPSULE | Refills: 1 | Status: SHIPPED | OUTPATIENT
Start: 2019-11-19 | End: 2019-12-16 | Stop reason: SDUPTHER

## 2019-11-19 RX ORDER — OMEPRAZOLE 40 MG/1
40 CAPSULE, DELAYED RELEASE ORAL DAILY
Qty: 30 CAPSULE | Refills: 1 | Status: SHIPPED | OUTPATIENT
Start: 2019-11-19 | End: 2019-12-16 | Stop reason: ALTCHOICE

## 2019-11-19 RX ORDER — CLONAZEPAM 0.5 MG/1
0.5 TABLET ORAL 2 TIMES DAILY
Qty: 60 TABLET | Refills: 1 | Status: SHIPPED | OUTPATIENT
Start: 2019-11-19 | End: 2019-12-16 | Stop reason: SDUPTHER

## 2019-12-16 ENCOUNTER — OFFICE VISIT (OUTPATIENT)
Dept: FAMILY MEDICINE CLINIC | Facility: CLINIC | Age: 59
End: 2019-12-16
Payer: COMMERCIAL

## 2019-12-16 VITALS
HEIGHT: 69 IN | HEART RATE: 76 BPM | BODY MASS INDEX: 30.81 KG/M2 | WEIGHT: 208 LBS | OXYGEN SATURATION: 97 % | DIASTOLIC BLOOD PRESSURE: 68 MMHG | TEMPERATURE: 96.9 F | SYSTOLIC BLOOD PRESSURE: 112 MMHG

## 2019-12-16 DIAGNOSIS — E78.1 ESSENTIAL HYPERTRIGLYCERIDEMIA: Primary | ICD-10-CM

## 2019-12-16 DIAGNOSIS — F41.9 ANXIETY: ICD-10-CM

## 2019-12-16 DIAGNOSIS — F33.42 RECURRENT MAJOR DEPRESSIVE DISORDER, IN FULL REMISSION (HCC): ICD-10-CM

## 2019-12-16 DIAGNOSIS — Z23 ENCOUNTER FOR IMMUNIZATION: ICD-10-CM

## 2019-12-16 PROCEDURE — 99214 OFFICE O/P EST MOD 30 MIN: CPT | Performed by: FAMILY MEDICINE

## 2019-12-16 PROCEDURE — 90682 RIV4 VACC RECOMBINANT DNA IM: CPT

## 2019-12-16 PROCEDURE — 3008F BODY MASS INDEX DOCD: CPT | Performed by: FAMILY MEDICINE

## 2019-12-16 PROCEDURE — 90471 IMMUNIZATION ADMIN: CPT

## 2019-12-16 PROCEDURE — 1036F TOBACCO NON-USER: CPT | Performed by: FAMILY MEDICINE

## 2019-12-16 RX ORDER — CLONAZEPAM 0.5 MG/1
0.5 TABLET ORAL 2 TIMES DAILY
Qty: 60 TABLET | Refills: 5 | Status: SHIPPED | OUTPATIENT
Start: 2019-12-16 | End: 2020-06-18 | Stop reason: SDUPTHER

## 2019-12-16 RX ORDER — DULOXETIN HYDROCHLORIDE 60 MG/1
60 CAPSULE, DELAYED RELEASE ORAL DAILY
Qty: 30 CAPSULE | Refills: 5 | Status: SHIPPED | OUTPATIENT
Start: 2019-12-16 | End: 2020-06-18 | Stop reason: SDUPTHER

## 2019-12-16 NOTE — ASSESSMENT & PLAN NOTE
Needs to return for fasting blood work to assess lipids in addition to CBC and CMP  He declines PSA testing after informed discussion

## 2019-12-16 NOTE — PROGRESS NOTES
BMI Counseling: Body mass index is 30 49 kg/m²  The BMI is above normal  Nutrition recommendations include decreasing portion sizes, encouraging healthy choices of fruits and vegetables, consuming healthier snacks and moderation in carbohydrate intake  Exercise recommendations include moderate physical activity 150 minutes/week and exercising 3-5 times per week  No pharmacotherapy was ordered  Assessment/Plan:  Depression  He continues on Cymbalta  His mood is excellent  Anxiety  Anxiety is improved over previous but he continues to the Klonopin b i d     We refilled his prescription today  Essential hypertriglyceridemia  Needs to return for fasting blood work to assess lipids in addition to CBC and CMP  He declines PSA testing after informed discussion  Macular lesion right face  It is approximately 4 mm in regular diameter  Medium brown pigmentation  No regular borders, asymmetry X cetera  Will observe  If he notes any change did offer referral to Dermatology  He is in agreement with this plan  Diagnoses and all orders for this visit:    Essential hypertriglyceridemia    Recurrent major depressive disorder, in full remission ([address] )  -     DULoxetine (CYMBALTA) 60 mg delayed release capsule; Take 1 capsule (60 mg total) by mouth daily    Anxiety  -     clonazePAM (KlonoPIN) 0 5 mg tablet; Take 1 tablet (0 5 mg total) by mouth 2 (two) times a day          Subjective:   Chief Complaint   Patient presents with    med check     here for checkup  pt give a flu shot  he will schedule for fbw  C/w Cymbalta and mood is good  Uses Klonopin 1/2 in am and generally 1 in afternoon  Sleeps well  Interest good, concentration good, appetite good  No suicidal ideation  Palpitations not exertional  Reflux is good and uses only an occasional Pepcid  Patient ID: Mary Lou Waite is a 61 y o  male      HPI  The patient is a 71-year-old male who presents today for follow-up of anxiety, depression, gastroesophageal reflux disease addition to hypertriglyceridemia evaluation of a brown spot on his face that he noticed several months ago  He states that overall he has been doing well  His mood is good  His wife's breast cancer is in remission and his daughters lupus seems to be well treated presently  He states he continues with anxiety knee uses a half of a Klonopin in the morning and generally 1 in the afternoon  He states that he sleeps well at night and his interest is good  He has been hunting a lot this winter  His concentration is good  His appetite is good and he has no suicidal ideation  He has some palpitations at times which she believes are related anxiety  He has no exertional chest pain shortness of breath X cetera  His reflux has been under good control  He uses occasional Pepcid but no omeprazole  He has no dysphagia, anorexia, weight loss X cetera  He has been getting no regular aerobic exercise but plans to do this over the winter  He is trying to watch his diet  The following portions of the patient's history were reviewed and updated as appropriate: allergies, current medications, past medical history, past social history, past surgical history and problem list     Review of Systems   Constitution: Negative  Cardiovascular: Positive for palpitations  Negative for chest pain, leg swelling, near-syncope, orthopnea and paroxysmal nocturnal dyspnea  Respiratory: Negative  Endocrine: Negative  Negative for polydipsia and polyphagia  Skin:        As noted in the HPI   Gastrointestinal: Negative for bowel incontinence, constipation and hematochezia  Genitourinary: Negative for dysuria, nocturia and urgency  Neurological: Negative for dizziness and headaches  Psychiatric/Behavioral: Negative for depression  The patient is nervous/anxious  The patient does not have insomnia  Objective:    Physical Exam   Constitutional: He is oriented to person, place, and time  He appears well-developed and well-nourished  No distress  HENT:   Mouth/Throat: No oropharyngeal exudate  Eyes: Right eye exhibits no discharge  Left eye exhibits no discharge  Neck: No JVD present  No thyromegaly present  Cardiovascular: Normal rate, regular rhythm and normal heart sounds  Pulmonary/Chest: Effort normal and breath sounds normal  No respiratory distress  He has no wheezes  He has no rales  Musculoskeletal: He exhibits no edema  Lymphadenopathy:     He has no cervical adenopathy  Neurological: He is alert and oriented to person, place, and time  Skin:   He has a flat brown macule approximately 4 mm in diameter  It is not raised or regular  Psychiatric:   Anxious   Nursing note and vitals reviewed

## 2019-12-16 NOTE — ASSESSMENT & PLAN NOTE
Anxiety is improved over previous but he continues to the Klonopin b i d     We refilled his prescription today

## 2019-12-23 ENCOUNTER — CLINICAL SUPPORT (OUTPATIENT)
Dept: FAMILY MEDICINE CLINIC | Facility: CLINIC | Age: 59
End: 2019-12-23
Payer: COMMERCIAL

## 2019-12-23 DIAGNOSIS — Z13.0 SCREENING FOR IRON DEFICIENCY ANEMIA: ICD-10-CM

## 2019-12-23 DIAGNOSIS — E78.1 ESSENTIAL HYPERTRIGLYCERIDEMIA: Primary | ICD-10-CM

## 2019-12-23 PROCEDURE — 36415 COLL VENOUS BLD VENIPUNCTURE: CPT

## 2019-12-24 LAB
ALBUMIN SERPL-MCNC: 4.7 G/DL (ref 3.6–5.1)
ALBUMIN/GLOB SERPL: 2.4 (CALC) (ref 1–2.5)
ALP SERPL-CCNC: 99 U/L (ref 40–115)
ALT SERPL-CCNC: 40 U/L (ref 9–46)
AST SERPL-CCNC: 22 U/L (ref 10–35)
BILIRUB SERPL-MCNC: 0.6 MG/DL (ref 0.2–1.2)
BUN SERPL-MCNC: 17 MG/DL (ref 7–25)
BUN/CREAT SERPL: ABNORMAL (CALC) (ref 6–22)
CALCIUM SERPL-MCNC: 9.5 MG/DL (ref 8.6–10.3)
CHLORIDE SERPL-SCNC: 105 MMOL/L (ref 98–110)
CHOLEST SERPL-MCNC: 221 MG/DL
CHOLEST/HDLC SERPL: 5.8 (CALC)
CO2 SERPL-SCNC: 27 MMOL/L (ref 20–32)
CREAT SERPL-MCNC: 1.02 MG/DL (ref 0.7–1.33)
ERYTHROCYTE [DISTWIDTH] IN BLOOD BY AUTOMATED COUNT: 12.9 % (ref 11–15)
GLOBULIN SER CALC-MCNC: 2 G/DL (CALC) (ref 1.9–3.7)
GLUCOSE SERPL-MCNC: 103 MG/DL (ref 65–99)
HCT VFR BLD AUTO: 45 % (ref 38.5–50)
HDLC SERPL-MCNC: 38 MG/DL
HGB BLD-MCNC: 15.3 G/DL (ref 13.2–17.1)
LDLC SERPL CALC-MCNC: 127 MG/DL (CALC)
MCH RBC QN AUTO: 30.4 PG (ref 27–33)
MCHC RBC AUTO-ENTMCNC: 34 G/DL (ref 32–36)
MCV RBC AUTO: 89.5 FL (ref 80–100)
NONHDLC SERPL-MCNC: 183 MG/DL (CALC)
PLATELET # BLD AUTO: 237 THOUSAND/UL (ref 140–400)
PMV BLD REES-ECKER: 9.7 FL (ref 7.5–12.5)
POTASSIUM SERPL-SCNC: 4.5 MMOL/L (ref 3.5–5.3)
PROT SERPL-MCNC: 6.7 G/DL (ref 6.1–8.1)
RBC # BLD AUTO: 5.03 MILLION/UL (ref 4.2–5.8)
SL AMB EGFR AFRICAN AMERICAN: 93 ML/MIN/1.73M2
SL AMB EGFR NON AFRICAN AMERICAN: 80 ML/MIN/1.73M2
SODIUM SERPL-SCNC: 139 MMOL/L (ref 135–146)
TRIGL SERPL-MCNC: 395 MG/DL
WBC # BLD AUTO: 4.9 THOUSAND/UL (ref 3.8–10.8)

## 2020-06-18 DIAGNOSIS — F41.9 ANXIETY: ICD-10-CM

## 2020-06-18 DIAGNOSIS — F33.42 RECURRENT MAJOR DEPRESSIVE DISORDER, IN FULL REMISSION (HCC): ICD-10-CM

## 2020-06-18 RX ORDER — CLONAZEPAM 0.5 MG/1
0.5 TABLET ORAL 2 TIMES DAILY
Qty: 60 TABLET | Refills: 5 | Status: SHIPPED | OUTPATIENT
Start: 2020-06-18 | End: 2020-07-07 | Stop reason: SDUPTHER

## 2020-06-18 RX ORDER — DULOXETIN HYDROCHLORIDE 60 MG/1
60 CAPSULE, DELAYED RELEASE ORAL DAILY
Qty: 30 CAPSULE | Refills: 5 | Status: SHIPPED | OUTPATIENT
Start: 2020-06-18 | End: 2020-07-07 | Stop reason: SDUPTHER

## 2020-07-07 ENCOUNTER — TELEMEDICINE (OUTPATIENT)
Dept: FAMILY MEDICINE CLINIC | Facility: CLINIC | Age: 60
End: 2020-07-07
Payer: COMMERCIAL

## 2020-07-07 VITALS
SYSTOLIC BLOOD PRESSURE: 137 MMHG | BODY MASS INDEX: 30.07 KG/M2 | HEIGHT: 69 IN | DIASTOLIC BLOOD PRESSURE: 88 MMHG | WEIGHT: 203 LBS | HEART RATE: 92 BPM | TEMPERATURE: 97.2 F

## 2020-07-07 DIAGNOSIS — F41.9 ANXIETY: ICD-10-CM

## 2020-07-07 DIAGNOSIS — F33.42 RECURRENT MAJOR DEPRESSIVE DISORDER, IN FULL REMISSION (HCC): ICD-10-CM

## 2020-07-07 DIAGNOSIS — K21.00 GASTROESOPHAGEAL REFLUX DISEASE WITH ESOPHAGITIS: Primary | ICD-10-CM

## 2020-07-07 PROCEDURE — 3008F BODY MASS INDEX DOCD: CPT | Performed by: FAMILY MEDICINE

## 2020-07-07 PROCEDURE — 99214 OFFICE O/P EST MOD 30 MIN: CPT | Performed by: FAMILY MEDICINE

## 2020-07-07 PROCEDURE — 1036F TOBACCO NON-USER: CPT | Performed by: FAMILY MEDICINE

## 2020-07-07 RX ORDER — OMEPRAZOLE 40 MG/1
CAPSULE, DELAYED RELEASE ORAL
COMMUNITY
End: 2020-07-07 | Stop reason: ALTCHOICE

## 2020-07-07 RX ORDER — CLONAZEPAM 0.5 MG/1
0.5 TABLET ORAL 2 TIMES DAILY
Qty: 60 TABLET | Refills: 5 | Status: SHIPPED | OUTPATIENT
Start: 2020-07-07 | End: 2021-01-25

## 2020-07-07 RX ORDER — DULOXETIN HYDROCHLORIDE 60 MG/1
60 CAPSULE, DELAYED RELEASE ORAL DAILY
Qty: 30 CAPSULE | Refills: 5 | Status: SHIPPED | OUTPATIENT
Start: 2020-07-07 | End: 2021-01-22 | Stop reason: SDUPTHER

## 2020-07-07 NOTE — ASSESSMENT & PLAN NOTE
Continue with Pepcid as needed  He has no worrisome symptoms such as dysphagia, anorexia, weight loss X cetera

## 2020-07-07 NOTE — PROGRESS NOTES
Assessment/Plan:  Anxiety  His anxiety level is well controlled with combination of Cymbalta and Klonopin  Will continue with same  Depression  His symptoms of depression are well controlled with Cymbalta  Review of vegetative symptoms reveals that he has none presently  Gastroesophageal reflux disease with esophagitis  Continue with Pepcid as needed  He has no worrisome symptoms such as dysphagia, anorexia, weight loss X cetera  Diagnoses and all orders for this visit:    Gastroesophageal reflux disease with esophagitis    Anxiety  -     clonazePAM (KlonoPIN) 0 5 mg tablet; Take 1 tablet (0 5 mg total) by mouth 2 (two) times a day    Recurrent major depressive disorder, in full remission (H[facility])  -     DULoxetine (CYMBALTA) 60 mg delayed release capsule; Take 1 capsule (60 mg total) by mouth daily    Other orders  -     Discontinue: omeprazole (PriLOSEC) 40 MG capsule; TAKE 1 CAPSULE BY MOUTH EVERY DAY          Subjective:   Chief Complaint   Patient presents with    med check     pt having a virtual 6m med check        Patient ID: Sophy Hanson is a 61 y o  male  Mood good, energy good, concentration , sleep good, appetite good  Anxiety level is good  No other sx  Occasional Pepcid  No dysphagia  BMI Counseling: Body mass index is 29 76 kg/m²  The BMI is above normal  Nutrition recommendations include decreasing portion sizes, encouraging healthy choices of fruits and vegetables, consuming healthier snacks and moderation in carbohydrate intake  Exercise recommendations include moderate physical activity 150 minutes/week and exercising 3-5 times per week  No pharmacotherapy was ordered  HPI  The patient is a 59-year-old male who presents today for routine follow-up of multiple medical problems including anxiety, depression, hypertriglyceridemia in addition to gastroesophageal reflux disease  He states that overall he has been feeling well  He continues to work    He is looking for to retiring at age 61 at the end of the summer  Will work part-time after this  He states that overall he is doing well and he feels the Cymbalta is affective  He states that his mood is good, his energy level is good as well as concentration  He has been sleeping well and his appetite is good  His anxiety is well controlled with his Klonopin in addition to the Cymbalta  He states that his reflux is under good control  Occasionally takes Pepcid and he takes no more omeprazole  He has no odynophagia, dysphagia, anorexia weight loss X cetera  No cardiovascular pulmonary complaint  The following portions of the patient's history were reviewed and updated as appropriate: allergies, current medications, past family history, past medical history, past social history, past surgical history and problem list     Review of Systems   Constitution: Negative  Cardiovascular: Negative  Respiratory: Negative  Endocrine: Negative  Musculoskeletal: Positive for arthritis, joint pain and stiffness  Left shoulder pain and stiffness   Gastrointestinal: Negative for constipation and diarrhea  Genitourinary: Negative for bladder incontinence  Neurological: Negative for dizziness and headaches  Psychiatric/Behavioral: Negative for depression  The patient is not nervous/anxious  Symptoms well controlled with Cymbalta as well as Klonopin  Objective:    Physical Exam   Constitutional: He is oriented to person, place, and time  He appears well-developed  No distress  Overweight in no distress   Neck: No JVD present  Pulmonary/Chest: Effort normal  No respiratory distress  Musculoskeletal: He exhibits no edema  Neurological: He is alert and oriented to person, place, and time  Psychiatric: He has a normal mood and affect  Judgment and thought content normal    Nursing note and vitals reviewed

## 2020-07-07 NOTE — ASSESSMENT & PLAN NOTE
His anxiety level is well controlled with combination of Cymbalta and Klonopin  Will continue with same

## 2020-07-07 NOTE — ASSESSMENT & PLAN NOTE
His symptoms of depression are well controlled with Cymbalta  Review of vegetative symptoms reveals that he has none presently

## 2020-10-15 ENCOUNTER — IMMUNIZATIONS (OUTPATIENT)
Dept: FAMILY MEDICINE CLINIC | Facility: CLINIC | Age: 60
End: 2020-10-15
Payer: COMMERCIAL

## 2020-10-15 DIAGNOSIS — Z23 ENCOUNTER FOR IMMUNIZATION: Primary | ICD-10-CM

## 2020-10-16 PROCEDURE — 90682 RIV4 VACC RECOMBINANT DNA IM: CPT

## 2020-10-16 PROCEDURE — 90471 IMMUNIZATION ADMIN: CPT

## 2021-01-20 LAB — HBA1C MFR BLD HPLC: 5.4 %

## 2021-01-22 DIAGNOSIS — F33.42 RECURRENT MAJOR DEPRESSIVE DISORDER, IN FULL REMISSION (HCC): ICD-10-CM

## 2021-01-22 RX ORDER — DULOXETIN HYDROCHLORIDE 60 MG/1
60 CAPSULE, DELAYED RELEASE ORAL DAILY
Qty: 30 CAPSULE | Refills: 0 | Status: SHIPPED | OUTPATIENT
Start: 2021-01-22 | End: 2021-02-22 | Stop reason: SDUPTHER

## 2021-01-24 DIAGNOSIS — F41.9 ANXIETY: ICD-10-CM

## 2021-01-26 ENCOUNTER — OFFICE VISIT (OUTPATIENT)
Dept: FAMILY MEDICINE CLINIC | Facility: CLINIC | Age: 61
End: 2021-01-26
Payer: COMMERCIAL

## 2021-01-26 VITALS
HEIGHT: 69 IN | BODY MASS INDEX: 31.4 KG/M2 | TEMPERATURE: 97.8 F | SYSTOLIC BLOOD PRESSURE: 118 MMHG | WEIGHT: 212 LBS | OXYGEN SATURATION: 96 % | HEART RATE: 86 BPM | DIASTOLIC BLOOD PRESSURE: 68 MMHG

## 2021-01-26 DIAGNOSIS — K21.00 GASTROESOPHAGEAL REFLUX DISEASE WITH ESOPHAGITIS WITHOUT HEMORRHAGE: ICD-10-CM

## 2021-01-26 DIAGNOSIS — J30.1 SEASONAL ALLERGIC RHINITIS DUE TO POLLEN: ICD-10-CM

## 2021-01-26 DIAGNOSIS — Z13.1 SCREENING FOR DIABETES MELLITUS: Primary | ICD-10-CM

## 2021-01-26 DIAGNOSIS — F41.9 ANXIETY: ICD-10-CM

## 2021-01-26 DIAGNOSIS — E78.1 ESSENTIAL HYPERTRIGLYCERIDEMIA: ICD-10-CM

## 2021-01-26 DIAGNOSIS — F33.42 RECURRENT MAJOR DEPRESSIVE DISORDER, IN FULL REMISSION (HCC): ICD-10-CM

## 2021-01-26 DIAGNOSIS — Z11.4 SCREENING FOR HIV (HUMAN IMMUNODEFICIENCY VIRUS): ICD-10-CM

## 2021-01-26 PROCEDURE — 3725F SCREEN DEPRESSION PERFORMED: CPT | Performed by: FAMILY MEDICINE

## 2021-01-26 PROCEDURE — 1036F TOBACCO NON-USER: CPT | Performed by: FAMILY MEDICINE

## 2021-01-26 PROCEDURE — 99214 OFFICE O/P EST MOD 30 MIN: CPT | Performed by: FAMILY MEDICINE

## 2021-01-26 PROCEDURE — 3008F BODY MASS INDEX DOCD: CPT | Performed by: FAMILY MEDICINE

## 2021-01-26 RX ORDER — CLONAZEPAM 0.5 MG/1
TABLET ORAL
Qty: 60 TABLET | Refills: 5 | Status: SHIPPED | OUTPATIENT
Start: 2021-01-26 | End: 2021-01-26 | Stop reason: SDUPTHER

## 2021-01-26 RX ORDER — CLONAZEPAM 0.5 MG/1
0.5 TABLET ORAL 2 TIMES DAILY
Qty: 60 TABLET | Refills: 5 | Status: SHIPPED | OUTPATIENT
Start: 2021-01-26 | End: 2021-08-16 | Stop reason: SDUPTHER

## 2021-01-26 NOTE — PROGRESS NOTES
BMI Counseling: Body mass index is 31 08 kg/m²  The BMI is above normal  Nutrition recommendations include decreasing portion sizes, encouraging healthy choices of fruits and vegetables, consuming healthier snacks and moderation in carbohydrate intake  Exercise recommendations include moderate physical activity 150 minutes/week and exercising 3-5 times per week  No pharmacotherapy was ordered  Assessment/Plan:  Gastroesophageal reflux disease with esophagitis  Uses p r n  Pepcid  He has no concerning symptoms presently  Allergic rhinitis, seasonal  Continue use loratadine as needed  Depression  He is in remission with Cymbalta and will continue same  Anxiety  The patient continues to take clonazepam for his anxiety which is affective  There has been no evidence of tolerance, dependence X cetera  Will continue with same  Diagnoses and all orders for this visit:    Screening for diabetes mellitus    Gastroesophageal reflux disease with esophagitis without hemorrhage    Essential hypertriglyceridemia    Screening for HIV (human immunodeficiency virus)    Anxiety  -     clonazePAM (KlonoPIN) 0 5 mg tablet; Take 1 tablet (0 5 mg total) by mouth 2 (two) times a day    Seasonal allergic rhinitis due to pollen    Recurrent major depressive disorder, in full remission ([facility] )    Other orders  -     Cancel: CBC  -     Cancel: Comprehensive metabolic panel  -     Cancel: Lipid panel  -     Cancel: Human Immunodeficiency Virus 1/2 Antigen / Antibody ( Fourth Generation) with Reflex Testing          Subjective:   Chief Complaint   Patient presents with    Follow-up     pt here for a med check        Patient ID: Lee Colbert is a 61 y o  male  HPI  The patient is a 80-year-old male who presents today for routine follow-up of multiple medical problems including anxiety, depression as well as gastroesophageal reflux disease, seasonal allergic rhinitis among other    He states that overall he has been feeling well  He has been compliant with his Cymbalta at his mood is been good  He continues to take clonazepam for his anxiety which has been in check  He has no cardiovascular pulmonary complaint  No GI or  complaint  His gastroesophageal reflux disease has been stable without dysphagia, anorexia, weight loss X cetera  We reviewed his blood work from the [place] which was done recently  His TSH was acceptable as well as PSA being low  Urinalysis was normal   Blood chemistries were normal with the exception of mildly elevated blood sugar of 105  Lipid profile looks good with the exception of triglycerides minimally elevated at 194  CBC was normal   A1c was 5 4  The following portions of the patient's history were reviewed and updated as appropriate: allergies, current medications, past family history, past medical history, past social history, past surgical history and problem list     Review of Systems   Constitution: Negative  Cardiovascular: Negative  Respiratory: Negative  Endocrine: Negative  Gastrointestinal: Positive for heartburn  Negative for constipation, diarrhea and dysphagia  Genitourinary: Negative  Neurological: Negative  Psychiatric/Behavioral: Positive for depression  Negative for suicidal ideas  The patient is nervous/anxious  The patient does not have insomnia  Objective:    Physical Exam   Constitutional: He is oriented to person, place, and time  He appears well-developed  Overweight in no distress   Neck: No JVD present  No thyromegaly present  Cardiovascular: Normal rate and normal heart sounds  Pulmonary/Chest: Effort normal    Abdominal: Soft  Lymphadenopathy:     He has no cervical adenopathy  Neurological: He is alert and oriented to person, place, and time  Psychiatric: He has a normal mood and affect  Thought content normal    Vitals reviewed        Wt Readings from Last 12 Encounters:   01/26/21 96 2 kg (212 lb)   07/07/20 92 1 kg (203 lb)   12/16/19 94 3 kg (208 lb)   05/23/19 90 7 kg (200 lb)   01/31/19 96 2 kg (212 lb)   11/01/18 93 9 kg (207 lb)   07/02/18 93 9 kg (207 lb)   04/26/18 93 9 kg (207 lb)   10/26/17 91 2 kg (201 lb)   05/24/17 91 3 kg (201 lb 6 1 oz)   04/27/17 93 kg (205 lb)   11/10/16 95 3 kg (210 lb)   ]

## 2021-01-26 NOTE — ASSESSMENT & PLAN NOTE
The patient continues to take clonazepam for his anxiety which is affective  There has been no evidence of tolerance, dependence X cetera  Will continue with same

## 2021-02-22 DIAGNOSIS — F33.42 RECURRENT MAJOR DEPRESSIVE DISORDER, IN FULL REMISSION (HCC): ICD-10-CM

## 2021-02-22 RX ORDER — DULOXETIN HYDROCHLORIDE 60 MG/1
60 CAPSULE, DELAYED RELEASE ORAL DAILY
Qty: 30 CAPSULE | Refills: 5 | Status: SHIPPED | OUTPATIENT
Start: 2021-02-22 | End: 2021-08-16 | Stop reason: SDUPTHER

## 2021-06-02 ENCOUNTER — PREP FOR PROCEDURE (OUTPATIENT)
Dept: GASTROENTEROLOGY | Facility: CLINIC | Age: 61
End: 2021-06-02

## 2021-06-02 ENCOUNTER — TELEPHONE (OUTPATIENT)
Dept: GASTROENTEROLOGY | Facility: CLINIC | Age: 61
End: 2021-06-02

## 2021-06-02 DIAGNOSIS — Z80.0 FAMILY HISTORY OF COLON CANCER IN MOTHER: ICD-10-CM

## 2021-06-02 DIAGNOSIS — Z86.010 HX OF COLONIC POLYPS: Primary | ICD-10-CM

## 2021-06-02 DIAGNOSIS — Z86.010 HISTORY OF COLON POLYPS: Primary | ICD-10-CM

## 2021-06-29 ENCOUNTER — ANESTHESIA EVENT (OUTPATIENT)
Dept: GASTROENTEROLOGY | Facility: AMBULATORY SURGERY CENTER | Age: 61
End: 2021-06-29

## 2021-06-30 ENCOUNTER — HOSPITAL ENCOUNTER (OUTPATIENT)
Dept: GASTROENTEROLOGY | Facility: AMBULATORY SURGERY CENTER | Age: 61
Discharge: HOME | End: 2021-06-30
Payer: COMMERCIAL

## 2021-06-30 ENCOUNTER — ANESTHESIA (OUTPATIENT)
Dept: GASTROENTEROLOGY | Facility: AMBULATORY SURGERY CENTER | Age: 61
End: 2021-06-30

## 2021-06-30 VITALS
BODY MASS INDEX: 29.62 KG/M2 | SYSTOLIC BLOOD PRESSURE: 110 MMHG | HEART RATE: 73 BPM | OXYGEN SATURATION: 97 % | HEIGHT: 69 IN | TEMPERATURE: 97.3 F | RESPIRATION RATE: 18 BRPM | DIASTOLIC BLOOD PRESSURE: 72 MMHG | WEIGHT: 200 LBS

## 2021-06-30 DIAGNOSIS — Z80.0 FAMILY HISTORY OF COLON CANCER IN MOTHER: ICD-10-CM

## 2021-06-30 DIAGNOSIS — Z86.010 HISTORY OF COLON POLYPS: ICD-10-CM

## 2021-06-30 PROCEDURE — G0105 COLORECTAL SCRN; HI RISK IND: HCPCS | Performed by: INTERNAL MEDICINE

## 2021-06-30 PROCEDURE — 00812 ANES LWR INTST SCR COLSC: CPT | Performed by: NURSE ANESTHETIST, CERTIFIED REGISTERED

## 2021-06-30 RX ORDER — SODIUM CHLORIDE 9 MG/ML
30 INJECTION, SOLUTION INTRAVENOUS CONTINUOUS
Status: DISCONTINUED | OUTPATIENT
Start: 2021-06-30 | End: 2021-07-04 | Stop reason: HOSPADM

## 2021-06-30 RX ORDER — PROPOFOL 10 MG/ML
INJECTION, EMULSION INTRAVENOUS AS NEEDED
Status: DISCONTINUED | OUTPATIENT
Start: 2021-06-30 | End: 2021-06-30

## 2021-06-30 RX ORDER — SODIUM CHLORIDE 9 MG/ML
20 INJECTION, SOLUTION INTRAVENOUS CONTINUOUS
Status: DISCONTINUED | OUTPATIENT
Start: 2021-06-30 | End: 2021-07-04 | Stop reason: HOSPADM

## 2021-06-30 RX ORDER — SODIUM CHLORIDE 9 MG/ML
INJECTION, SOLUTION INTRAVENOUS CONTINUOUS PRN
Status: DISCONTINUED | OUTPATIENT
Start: 2021-06-30 | End: 2021-06-30

## 2021-06-30 RX ADMIN — PROPOFOL 20 MG: 10 INJECTION, EMULSION INTRAVENOUS at 11:10

## 2021-06-30 RX ADMIN — SODIUM CHLORIDE: 9 INJECTION, SOLUTION INTRAVENOUS at 11:05

## 2021-06-30 RX ADMIN — PROPOFOL 20 MG: 10 INJECTION, EMULSION INTRAVENOUS at 11:11

## 2021-06-30 RX ADMIN — PROPOFOL 20 MG: 10 INJECTION, EMULSION INTRAVENOUS at 11:13

## 2021-06-30 RX ADMIN — PROPOFOL 100 MG: 10 INJECTION, EMULSION INTRAVENOUS at 11:09

## 2021-06-30 RX ADMIN — PROPOFOL 20 MG: 10 INJECTION, EMULSION INTRAVENOUS at 11:17

## 2021-06-30 RX ADMIN — PROPOFOL 20 MG: 10 INJECTION, EMULSION INTRAVENOUS at 11:20

## 2021-06-30 NOTE — INTERVAL H&P NOTE
H&P reviewed  After examining the patient I find no changes in the patients condition since the H&P had been written      Vitals:    06/30/21 1010   BP: 121/83   Pulse: 84   Resp: 18   Temp: (!) 97 3 °F (36 3 °C)   SpO2: 94%

## 2021-06-30 NOTE — H&P
History and Physical - [facility]  Lauren Adams 61 y o  male MRN: [id number]                  HPI: Lauren Adams is a 61y o  year old male who presents for screening colonoscopy  Patient has history of colon polyp  There is family history of colon cancer in mother  REVIEW OF SYSTEMS: Per the HPI, and otherwise unremarkable      Historical Information   Past Medical History:   Diagnosis Date    Colon polyp     CPAP (continuous positive airway pressure) dependence     Depression     ETD (Eustachian tube dysfunction), right 8/11/2016    GERD (gastroesophageal reflux disease)     PONV (postoperative nausea and vomiting)     vomiting    Sleep apnea      Past Surgical History:   Procedure Laterality Date    CHOLECYSTECTOMY      COLONOSCOPY  02/17/2016    GALLBLADDER SURGERY      NASAL SEPTUM SURGERY       Social History   Social History     Substance and Sexual Activity   Alcohol Use Yes    Comment: occ/ rare     Social History     Substance and Sexual Activity   Drug Use Never     Social History     Tobacco Use   Smoking Status Former Smoker   Smokeless Tobacco Never Used   Tobacco Comment    quit at 28 yrs old     Family History   Problem Relation Age of Onset    Cancer Mother         colon    Heart disease Father     Melanoma Father        Meds/Allergies       Current Outpatient Medications:     clonazePAM (KlonoPIN) 0 5 mg tablet    DULoxetine (CYMBALTA) 60 mg delayed release capsule    loratadine (CLARITIN) 10 mg tablet    Current Facility-Administered Medications:     sodium chloride 0 9 % infusion, 30 mL/hr, Intravenous, Continuous    sodium chloride 0 9 % infusion, 20 mL/hr, Intravenous, Continuous    No Known Allergies    Objective     /83   Pulse 84   Temp (!) 97 3 °F (36 3 °C) (Temporal)   Resp 18   Ht 5' 9" (1 753 m)   Wt 90 7 kg (200 lb)   SpO2 94%   BMI 29 53 kg/m²       PHYSICAL EXAM    Gen: NAD  Head: NCAT  CV: RRR  CHEST: Clear  ABD: soft, NT/ND  EXT: no edema      ASSESSMENT/PLAN:  This is a 61y o  year old male here for colonoscopy, and he is stable and optimized for his procedure

## 2021-06-30 NOTE — ANESTHESIA PREPROCEDURE EVALUATION
Procedure:  COLONOSCOPY    Relevant Problems   CARDIO   (+) Essential hypertriglyceridemia      GI/HEPATIC   (+) Gastroesophageal reflux disease with esophagitis      MUSCULOSKELETAL   (+) Degenerative arthritis of finger, right   (+) Prepatellar bursitis of right knee      NEURO/PSYCH   (+) Anxiety   (+) Depression        Physical Exam    Airway    Mallampati score: II  TM Distance: >3 FB  Neck ROM: full     Dental       Cardiovascular  Rhythm: regular, Rate: normal,     Pulmonary  Pulmonary exam normal     Other Findings        Anesthesia Plan  ASA Score- 2     Anesthesia Type- IV sedation with anesthesia with ASA Monitors  Additional Monitors:   Airway Plan: NTT  Plan Factors-Exercise tolerance (METS): >4 METS  Chart reviewed  Existing labs reviewed  Patient summary reviewed  Induction- intravenous  Postoperative Plan-     Informed Consent- Anesthetic plan and risks discussed with patient

## 2021-06-30 NOTE — DISCHARGE INSTRUCTIONS
Colonoscopy   WHAT YOU NEED TO KNOW:   A colonoscopy is a procedure to examine the inside of your colon (intestine) with a scope  Polyps or tissue growths may have been removed during your colonoscopy  It is normal to feel bloated and to have some abdominal discomfort  You should be passing gas  If you have hemorrhoids or you had polyps removed, you may have a small amount of bleeding  DISCHARGE INSTRUCTIONS:   Seek care immediately if:    You have sudden, severe abdominal pain   You have problems swallowing   You have a large amount of black, sticky bowel movements or blood in your bowel movements   You have sudden trouble breathing   You feel weak, lightheaded, or faint or your heart beats faster than normal for you  Contact your healthcare provider if:    You have a fever and chills   You have nausea or are vomiting   Your abdomen is bloated or feels full and hard   You have abdominal pain   You have black, sticky bowel movements or blood in your bowel movements   You have not had a bowel movement for 3 days after your procedure   You have rash or hives   You have questions or concerns about your procedure  Activity:    Do not lift, strain, or run for 24 hours after your procedure  Rest after your High Fiber Diet   WHAT YOU NEED TO KNOW:   What is a high-fiber diet? A high-fiber diet includes foods that have a high amount of fiber  Fiber is the part of fruits, vegetables, and grains that is not broken down by your body  Fiber keeps your bowel movements regular  Fiber can also help lower your cholesterol level, control blood sugar in people with diabetes, and relieve constipation  Fiber can also help you control your weight because it helps you feel full faster  Most adults should eat 25 to 35 grams of fiber each day  Talk to your dietitian or healthcare provider about the amount of fiber you need  What foods are good sources of fiber? Foods with at least 4 grams of fiber per serving:      ? to ½ cup of high-fiber cereal (check the nutrition label on the box)    ½ cup of blackberries or raspberries    4 dried prunes    1 cooked artichoke    ½ cup of cooked legumes, such as lentils, or red, kidney, and navarrete beans    Foods with 1 to 3 grams of fiber per servin slice of whole-wheat, pumpernickel, or rye bread    ½ cup of cooked brown rice    4 whole-wheat crackers    1 cup of oatmeal    ½ cup of cereal with 1 to 3 grams of fiber per serving (check the nutrition label on the box)    1 small piece of fruit, such as an apple, banana, pear, kiwi, or orange    3 dates    ½ cup of canned apricots, fruit cocktail, peaches, or pears    ½ cup of raw or cooked vegetables, such as carrots, cauliflower, cabbage, spinach, squash, or corn  What are some ways that I can increase fiber in my diet? Choose brown or wild rice instead of white rice  Use whole wheat flour in recipes instead of white or all-purpose flour  Add beans and peas to casseroles or soups  Choose fresh fruit and vegetables with peels or skins on instead of juices  What other guidelines should I follow? Add fiber to your diet slowly  You may have abdominal discomfort, bloating, and gas if you add fiber to your diet too quickly  Drink plenty of liquids as you add fiber to your diet  You may have nausea or develop constipation if you do not drink enough water  Ask how much liquid to drink each day and which liquids are best for you  CARE AGREEMENT:   You have the right to help plan your care  Discuss treatment options with your healthcare provider to decide what care you want to receive  You always have the right to refuse treatment  The above information is an  only  It is not intended as medical advice for individual conditions or treatments   Talk to your doctor, nurse or pharmacist before following any medical regimen to see if it is safe and effective for you  © Copyright [address] Information is for End User's use only and may not be sold, redistributed or otherwise used for commercial purposes  All illustrations and images included in CareNotes® are the copyrighted property of A D A M [employer]  or Ashish Mckeon  Hemorrhoids   WHAT YOU NEED TO KNOW:   What are hemorrhoids? Hemorrhoids are swollen blood vessels inside your rectum (internal hemorrhoids) or on your anus (external hemorrhoids)  Sometimes a hemorrhoid may prolapse  This means it extends out of your anus  What increases my risk for hemorrhoids? Pregnancy or obesity    Straining or sitting for a long time during bowel movements    Liver disease    Weak muscles around the anus caused by older age, rectal surgery, or anal intercourse    A lack of physical activity    Chronic diarrhea or constipation    A low-fiber diet    What are the signs and symptoms of hemorrhoids? Pain or itching around your anus or inside your rectum    Swelling or bumps around your anus    Bright red blood in your bowel movement, on the toilet paper, or in the toilet bowl    Tissue bulging out of your anus (prolapsed hemorrhoids)    Incontinence (poor control over urine or bowel movements)    How are hemorrhoids diagnosed? Your healthcare provider will ask about your symptoms, the foods you eat, and your bowel movements  He or she will examine your anus for external hemorrhoids  You may need the following:  A digital rectal exam  is a test to check for hemorrhoids  Your healthcare provider will put a gloved finger inside your anus to feel for the hemorrhoids  An anoscopy  is a test that uses a scope (small tube with a light and camera on the end) to look at your hemorrhoids  How are hemorrhoids treated? Treatment will depend on your symptoms  You may need any of the following:  Medicines  can help decrease pain and swelling, and soften your bowel movement   The medicine may be a pill, pad, cream, or ointment  Procedures  may be used to shrink or remove your hemorrhoid  Examples include rubber-band ligation, sclerotherapy, and photocoagulation  These procedures may be done in your healthcare provider's office  Ask your healthcare provider for more information about these procedures  Surgery  may be needed to shrink or remove your hemorrhoids  How can I manage my symptoms? Apply ice on your anus for 15 to 20 minutes every hour or as directed  Use an ice pack, or put crushed ice in a plastic bag  Cover it with a towel before you apply it to your anus  Ice helps prevent tissue damage and decreases swelling and pain  Take a sitz bath  Fill a bathtub with 4 to 6 inches of warm water  You may also use a sitz bath pan that fits inside a toilet bowl  Sit in the sitz bath for 15 minutes  Do this 3 times a day, and after each bowel movement  The warm water can help decrease pain and swelling  Keep your anal area clean  Gently wash the area with warm water daily  Soap may irritate the area  After a bowel movement, wipe with moist towelettes or wet toilet paper  Dry toilet paper can irritate the area  How can I help prevent hemorrhoids? Do not strain to have a bowel movement  Do not sit on the toilet too long  These actions can increase pressure on the tissues in your rectum and anus  Drink plenty of liquids  Liquids can help prevent constipation  Ask how much liquid to drink each day and which liquids are best for you  Eat a variety of high-fiber foods  Examples include fruits, vegetables, and whole grains  Ask your healthcare provider how much fiber you need each day  You may need to take a fiber supplement  Exercise as directed  Exercise, such as walking, may make it easier to have a bowel movement  Ask your healthcare provider to help you create an exercise plan  Do not have anal sex  Anal sex can weaken the skin around your rectum and anus       Avoid heavy lifting  This can cause straining and increase your risk for another hemorrhoid  When should I seek immediate care? You have severe pain in your rectum or around your anus  You have severe pain in your abdomen and you are vomiting  You have bleeding from your anus that soaks through your underwear  When should I contact my healthcare provider? You have frequent and painful bowel movements  Your hemorrhoid looks or feels more swollen than usual      You do not have a bowel movement for 2 days or more  You see or feel tissue coming through your anus  You have questions or concerns about your condition or care  CARE AGREEMENT:   You have the right to help plan your care  Learn about your health condition and how it may be treated  Discuss treatment options with your healthcare providers to decide what care you want to receive  You always have the right to refuse treatment  The above information is an  only  It is not intended as medical advice for individual conditions or treatments  Talk to your doctor, nurse or pharmacist before following any medical regimen to see if it is safe and effective for you  © Copyright [address] Information is for End User's use only and may not be sold, redistributed or otherwise used for commercial purposes  All illustrations and images included in CareNotes® are the copyrighted property of [employer]  or [address] [place] [address] Rd   You have been given medicine to relax you  Do not drive or make important decisions until the day after your procedure  Return to your normal activity as directed   Relieve gas and discomfort from bloating by lying on your right side with a heating pad on your abdomen  You may need to take short walks to help the gas move out  Eat small meals until bloating is relieved  Follow up with your healthcare provider as directed: Write down your questions so you remember to ask them during your visits  If you take a blood thinner, please review the specific instructions from your endoscopist about when you should resume it  These can be found in the Recommendation and Your Medication list sections of this After Visit Summary

## 2021-06-30 NOTE — ANESTHESIA POSTPROCEDURE EVALUATION
Post-Op Assessment Note    CV Status:  Stable    Pain management: adequate     Mental Status:  Awake and sleepy   Hydration Status:  Euvolemic   PONV Controlled:  Controlled   Airway Patency:  Patent      Post Op Vitals Reviewed: Yes      Staff: CRNA         No complications documented      BP   100/58   Temp     Pulse  77   Resp   14   SpO2   98

## 2021-08-16 DIAGNOSIS — F41.9 ANXIETY: ICD-10-CM

## 2021-08-16 DIAGNOSIS — F33.42 RECURRENT MAJOR DEPRESSIVE DISORDER, IN FULL REMISSION (HCC): ICD-10-CM

## 2021-08-16 RX ORDER — CLONAZEPAM 0.5 MG/1
0.5 TABLET ORAL 2 TIMES DAILY
Qty: 60 TABLET | Refills: 0 | Status: SHIPPED | OUTPATIENT
Start: 2021-08-16 | End: 2021-09-27 | Stop reason: SDUPTHER

## 2021-08-16 RX ORDER — DULOXETIN HYDROCHLORIDE 60 MG/1
60 CAPSULE, DELAYED RELEASE ORAL DAILY
Qty: 30 CAPSULE | Refills: 0 | Status: SHIPPED | OUTPATIENT
Start: 2021-08-16 | End: 2021-09-03 | Stop reason: SDUPTHER

## 2021-09-03 DIAGNOSIS — F33.42 RECURRENT MAJOR DEPRESSIVE DISORDER, IN FULL REMISSION (HCC): ICD-10-CM

## 2021-09-03 RX ORDER — DULOXETIN HYDROCHLORIDE 60 MG/1
60 CAPSULE, DELAYED RELEASE ORAL DAILY
Qty: 30 CAPSULE | Refills: 0 | Status: SHIPPED | OUTPATIENT
Start: 2021-09-03 | End: 2021-09-27 | Stop reason: SDUPTHER

## 2021-09-22 DIAGNOSIS — F33.42 RECURRENT MAJOR DEPRESSIVE DISORDER, IN FULL REMISSION (HCC): ICD-10-CM

## 2021-09-27 ENCOUNTER — OFFICE VISIT (OUTPATIENT)
Dept: FAMILY MEDICINE CLINIC | Facility: CLINIC | Age: 61
End: 2021-09-27
Payer: COMMERCIAL

## 2021-09-27 VITALS
SYSTOLIC BLOOD PRESSURE: 124 MMHG | HEIGHT: 69 IN | BODY MASS INDEX: 31.84 KG/M2 | WEIGHT: 215 LBS | DIASTOLIC BLOOD PRESSURE: 80 MMHG

## 2021-09-27 DIAGNOSIS — Z23 ENCOUNTER FOR IMMUNIZATION: ICD-10-CM

## 2021-09-27 DIAGNOSIS — F33.42 RECURRENT MAJOR DEPRESSIVE DISORDER, IN FULL REMISSION (HCC): Primary | ICD-10-CM

## 2021-09-27 DIAGNOSIS — F41.9 ANXIETY: ICD-10-CM

## 2021-09-27 DIAGNOSIS — I35.8 SYSTOLIC MURMUR OF AORTA: ICD-10-CM

## 2021-09-27 PROCEDURE — 90682 RIV4 VACC RECOMBINANT DNA IM: CPT

## 2021-09-27 PROCEDURE — 3008F BODY MASS INDEX DOCD: CPT | Performed by: FAMILY MEDICINE

## 2021-09-27 PROCEDURE — 1036F TOBACCO NON-USER: CPT | Performed by: FAMILY MEDICINE

## 2021-09-27 PROCEDURE — 90471 IMMUNIZATION ADMIN: CPT

## 2021-09-27 PROCEDURE — 99214 OFFICE O/P EST MOD 30 MIN: CPT | Performed by: FAMILY MEDICINE

## 2021-09-27 RX ORDER — DULOXETIN HYDROCHLORIDE 60 MG/1
60 CAPSULE, DELAYED RELEASE ORAL DAILY
Qty: 30 CAPSULE | Refills: 5 | Status: SHIPPED | OUTPATIENT
Start: 2021-09-27 | End: 2022-03-24 | Stop reason: SDUPTHER

## 2021-09-27 RX ORDER — CLONAZEPAM 0.5 MG/1
0.5 TABLET ORAL 2 TIMES DAILY
Qty: 60 TABLET | Refills: 5 | Status: SHIPPED | OUTPATIENT
Start: 2021-09-27 | End: 2022-03-24 | Stop reason: SDUPTHER

## 2021-09-27 NOTE — PROGRESS NOTES
BMI Counseling: Body mass index is 31 75 kg/m²  The BMI is above normal  Nutrition recommendations include decreasing portion sizes, encouraging healthy choices of fruits and vegetables, consuming healthier snacks, limiting drinks that contain sugar and moderation in carbohydrate intake  Exercise recommendations include moderate physical activity 150 minutes/week and exercising 3-5 times per week  No pharmacotherapy was ordered  Rationale for BMI follow-up plan is due to patient being overweight or obese  Assessment/Plan:  Anxiety  Continues with Klonopin which remains effective at alleviating his chronic anxiety  Depression  His mood is good  He has minimal vegetative symptoms of depression  He is compliant his Cymbalta  TSH in January through the [place] was normal     Essential hypertriglyceridemia  Minimal elevation noted on blood work from the [place] in January  Continue with efforts at improvement in diet and exercise regimen    Systolic murmur of aorta  The patient has a systolic murmur in the aortic area  He is asymptomatic from a cardiovascular and pulmonary standpoint  We asked him to get an echocardiogram for further evaluation  He agrees with this plan  Diagnoses and all orders for this visit:    Rec[facility] major [facility] )  -     DULoxetine (CYMBALTA) 60 mg delayed release capsule; Take 1 capsule (60 mg total) by mouth daily    Systolic murmur of aorta  -     Echo complete with contrast if indicated; Future    Anxiety  -     clonazePAM (KlonoPIN) 0 5 mg tablet; Take 1 tablet (0 5 mg total) by mouth 2 (two) times a day    Encounter for immunization  -     influenza vaccine, quadrivalent, recombinant, PF, 0 5 mL, for patients 18 yr+ (FLUBLOK)          Subjective:   Chief Complaint   Patient presents with    Follow-up     Med Check        Patient ID: Dahiana Mariano is a 64 y o  male  Feeling well, sleep good, interest good, energy low, concentration ok, appetite good  Anxiety and mood good  HPI  The patient is a 20-year-old male who presents today for routine follow-up of multiple medical problems including anxiety and depression, gastroesophageal reflux disease, allergic rhinitis as well as a history of essential hypertriglyceridemia  We reviewed blood work from the [place] from January of 2021 which looks overall very good  He does have mildly elevated triglycerides in the 190 range  Additionally had borderline elevation of blood sugar but hemoglobin A1c was 5 4  PSA was normal   He states he has been feeling well  He sleeping well his interest level is good  His concentration is good as well as appetite  Overall he feels like his anxiety level in mood or fine  He does have low energy which she attributes to the aging process  He has no cardiovascular or pulmonary complaint  The following portions of the patient's history were reviewed and updated as appropriate: allergies, current medications, past family history, past medical history, past social history, past surgical history and problem list     Review of Systems   Constitutional:        States he feels more fatigued in general than when he was younger  Cardiovascular: Negative  Respiratory: Negative  Endocrine: Negative  Hematologic/Lymphatic: Negative  Musculoskeletal: Positive for stiffness  Gastrointestinal: Negative  Genitourinary: Negative  Neurological: Negative  Psychiatric/Behavioral: Negative for depression and suicidal ideas  The patient is nervous/anxious  The patient does not have insomnia  Vivid dreaming         Objective:    Physical Exam  Vitals and nursing note reviewed  Constitutional:       Comments: Somewhat overweight and in no distress   Neck:      Vascular: No carotid bruit  Cardiovascular:      Rate and Rhythm: Normal rate and regular rhythm  Heart sounds: Murmur heard          Comments: Has a systolic murmur in the aortic area Pulmonary:      Effort: Pulmonary effort is normal       Breath sounds: Normal breath sounds  Musculoskeletal:      Right lower leg: No edema  Left lower leg: No edema  Lymphadenopathy:      Cervical: No cervical adenopathy  Neurological:      General: No focal deficit present  Mental Status: He is alert and oriented to person, place, and time  Psychiatric:         Mood and Affect: Mood normal          Thought Content:  Thought content normal          Judgment: Judgment normal          Wt Readings from Last 12 Encounters:   09/27/21 97 5 kg (215 lb)   06/30/21 90 7 kg (200 lb)   01/26/21 96 2 kg (212 lb)   07/07/20 92 1 kg (203 lb)   12/16/19 94 3 kg (208 lb)   05/23/19 90 7 kg (200 lb)   01/31/19 96 2 kg (212 lb)   11/01/18 93 9 kg (207 lb)   07/02/18 93 9 kg (207 lb)   04/26/18 93 9 kg (207 lb)   10/26/17 91 2 kg (201 lb)   05/24/17 91 3 kg (201 lb 6 1 oz)   ]

## 2021-09-28 RX ORDER — DULOXETIN HYDROCHLORIDE 60 MG/1
CAPSULE, DELAYED RELEASE ORAL
Qty: 30 CAPSULE | Refills: 0 | OUTPATIENT
Start: 2021-09-28

## 2021-09-28 NOTE — ASSESSMENT & PLAN NOTE
The patient has a systolic murmur in the aortic area  He is asymptomatic from a cardiovascular and pulmonary standpoint  We asked him to get an echocardiogram for further evaluation  He agrees with this plan

## 2021-09-28 NOTE — ASSESSMENT & PLAN NOTE
His mood is good  He has minimal vegetative symptoms of depression  He is compliant his Cymbalta    TSH in January through the [address] was normal

## 2021-09-28 NOTE — ASSESSMENT & PLAN NOTE
Minimal elevation noted on blood work from the [facility] in January    Continue with efforts at improvement in diet and exercise regimen

## 2021-10-14 ENCOUNTER — HOSPITAL ENCOUNTER (OUTPATIENT)
Dept: NON INVASIVE DIAGNOSTICS | Age: 61
Discharge: HOME | End: 2021-10-14
Payer: COMMERCIAL

## 2021-10-14 VITALS
HEART RATE: 97 BPM | WEIGHT: 215 LBS | BODY MASS INDEX: 31.84 KG/M2 | SYSTOLIC BLOOD PRESSURE: 124 MMHG | DIASTOLIC BLOOD PRESSURE: 80 MMHG | HEIGHT: 69 IN

## 2021-10-14 DIAGNOSIS — I35.8 SYSTOLIC MURMUR OF AORTA: ICD-10-CM

## 2021-10-14 LAB
AORTIC ROOT: 3.2 CM
AORTIC VALVE MEAN VELOCITY: 11.4 M/S
APICAL FOUR CHAMBER EJECTION FRACTION: 64 %
ASCENDING AORTA: 3.6 CM
AV AREA BY CONTINUOUS VTI: 2.7 CM2
AV AREA PEAK VELOCITY: 2.8 CM2
AV LVOT MEAN GRADIENT: 4 MMHG
AV LVOT PEAK GRADIENT: 9 MMHG
AV MEAN GRADIENT: 6 MMHG
AV PEAK GRADIENT: 11 MMHG
AV VALVE AREA: 2.66 CM2
DOP CALC AO VTI: 29.32 CM
DOP CALC LVOT AREA: 3.14 CM2
DOP CALC LVOT DIAMETER: 2 CM
DOP CALC LVOT PEAK VEL VTI: 24.83 CM
DOP CALC LVOT PEAK VEL: 1.47 M/S
DOP CALC LVOT STROKE INDEX: 35.7 ML/M2
DOP CALC LVOT STROKE VOLUME: 77.97 CM3
E WAVE DECELERATION TIME: 126 MS
FRACTIONAL SHORTENING: 40 % (ref 28–44)
INTERVENTRICULAR SEPTUM IN DIASTOLE (PARASTERNAL SHORT AXIS VIEW): 1.2 CM
LEFT INTERNAL DIMENSION IN SYSTOLE: 2.5 CM (ref 2.1–4)
LEFT VENTRICULAR INTERNAL DIMENSION IN DIASTOLE: 4.2 CM (ref 6.25–9.32)
LEFT VENTRICULAR POSTERIOR WALL IN END DIASTOLE: 1.2 CM
LEFT VENTRICULAR STROKE VOLUME: 57 ML
LV EF: 69 %
MV E'TISSUE VEL-SEP: 7 CM/S
MV PEAK A VEL: 0.82 M/S
MV PEAK E VEL: 77 CM/S
MV STENOSIS PRESSURE HALF TIME: 0 MS
PA SYSTOLIC PRESSURE: 26 MMHG
RIGHT VENTRICLE ID DIMENSION: 3.6 CM
SL CV LV EF: 65
SL CV PED ECHO LEFT VENTRICLE DIASTOLIC VOLUME (MOD BIPLANE) 2D: 78 ML
SL CV PED ECHO LEFT VENTRICLE SYSTOLIC VOLUME (MOD BIPLANE) 2D: 21 ML
TR PEAK VELOCITY: 2.5 M/S
TRICUSPID VALVE PEAK REGURGITATION VELOCITY: 2.45 M/S
TRICUSPID VALVE S': 1.5 CM/S
TV PEAK GRADIENT: 24 MMHG
Z-SCORE OF LEFT VENTRICULAR DIMENSION IN END SYSTOLE: -5.97

## 2021-10-14 PROCEDURE — 93306 TTE W/DOPPLER COMPLETE: CPT | Performed by: INTERNAL MEDICINE

## 2021-10-14 PROCEDURE — 93306 TTE W/DOPPLER COMPLETE: CPT

## 2021-10-22 PROBLEM — G47.33 SLEEP APNEA, OBSTRUCTIVE: Status: ACTIVE | Noted: 2021-10-22

## 2021-10-22 PROBLEM — R06.83 SNORING: Status: ACTIVE | Noted: 2021-10-22

## 2021-10-22 PROBLEM — E78.2 MIXED HYPERLIPIDEMIA: Status: ACTIVE | Noted: 2021-10-22

## 2021-10-22 PROBLEM — H93.19 TINNITUS: Status: ACTIVE | Noted: 2021-10-22

## 2021-10-22 PROBLEM — I34.0 MILD MITRAL REGURGITATION: Status: ACTIVE | Noted: 2021-09-27

## 2021-10-22 PROBLEM — K21.9 GASTROESOPHAGEAL REFLUX DISEASE: Status: ACTIVE | Noted: 2021-10-22

## 2021-10-22 PROBLEM — E55.9 VITAMIN D DEFICIENCY: Status: ACTIVE | Noted: 2021-10-22

## 2022-03-24 ENCOUNTER — OFFICE VISIT (OUTPATIENT)
Dept: FAMILY MEDICINE CLINIC | Facility: CLINIC | Age: 62
End: 2022-03-24
Payer: COMMERCIAL

## 2022-03-24 VITALS
DIASTOLIC BLOOD PRESSURE: 80 MMHG | BODY MASS INDEX: 32.14 KG/M2 | SYSTOLIC BLOOD PRESSURE: 130 MMHG | HEIGHT: 69 IN | WEIGHT: 217 LBS

## 2022-03-24 DIAGNOSIS — F33.42 RECURRENT MAJOR DEPRESSIVE DISORDER, IN FULL REMISSION (HCC): ICD-10-CM

## 2022-03-24 DIAGNOSIS — F41.9 ANXIETY: ICD-10-CM

## 2022-03-24 DIAGNOSIS — E78.1 ESSENTIAL HYPERTRIGLYCERIDEMIA: ICD-10-CM

## 2022-03-24 DIAGNOSIS — K21.00 GASTROESOPHAGEAL REFLUX DISEASE WITH ESOPHAGITIS WITHOUT HEMORRHAGE: Primary | ICD-10-CM

## 2022-03-24 DIAGNOSIS — J30.1 SEASONAL ALLERGIC RHINITIS DUE TO POLLEN: ICD-10-CM

## 2022-03-24 PROBLEM — M70.41 PREPATELLAR BURSITIS OF RIGHT KNEE: Status: RESOLVED | Noted: 2018-07-02 | Resolved: 2022-03-24

## 2022-03-24 PROBLEM — M75.101 ROTATOR CUFF SYNDROME OF RIGHT SHOULDER: Status: RESOLVED | Noted: 2017-04-27 | Resolved: 2022-03-24

## 2022-03-24 PROCEDURE — 1036F TOBACCO NON-USER: CPT | Performed by: FAMILY MEDICINE

## 2022-03-24 PROCEDURE — 3008F BODY MASS INDEX DOCD: CPT | Performed by: FAMILY MEDICINE

## 2022-03-24 PROCEDURE — 99214 OFFICE O/P EST MOD 30 MIN: CPT | Performed by: FAMILY MEDICINE

## 2022-03-24 RX ORDER — FENOFIBRATE 48 MG/1
TABLET, COATED ORAL
COMMUNITY
Start: 2022-02-25

## 2022-03-24 RX ORDER — DULOXETIN HYDROCHLORIDE 60 MG/1
60 CAPSULE, DELAYED RELEASE ORAL DAILY
Qty: 30 CAPSULE | Refills: 5 | Status: SHIPPED | OUTPATIENT
Start: 2022-03-24

## 2022-03-24 RX ORDER — CLONAZEPAM 0.5 MG/1
0.5 TABLET ORAL 2 TIMES DAILY
Qty: 60 TABLET | Refills: 5 | Status: SHIPPED | OUTPATIENT
Start: 2022-03-24

## 2022-03-24 NOTE — PROGRESS NOTES
Assessment/Plan:  Gastroesophageal reflux disease with esophagitis  Continues with p r n  treatment with Pepcid  No worrisome symptoms  Allergic rhinitis, seasonal  Continues with over-the-counter loratadine  Anxiety  Continue with Klonopin which remains effective at treating his chronic anxiety  He has never had an escalation of dose  Depression  Continue with citalopram which remains effective at treating his vegetative symptoms of depression  He is not suicidal in overall he feels that his mood is good  Essential hypertriglyceridemia  Started on Tricor by the [facility]  will return for lipid profile this summer  Diagnoses and all orders for this visit:    Gastroesophageal reflux disease with esophagitis without hemorrhage    Recurrent major depressive disorder, in full remission ([facility] )    Anxiety    Seasonal allergic rhinitis due to pollen    Essential hypertriglyceridemia    Other orders  -     fenofibrate (TRICOR) 48 mg tablet  -     Cholecalciferol 50 MCG ([facility]) TABS; Take 1 tablet by mouth daily          Subjective:   Chief Complaint   Patient presents with    Follow-up     Med check        Patient ID: Leonel Godwin is a 64 y o  male  I am not too bad  Started on fenofibrate for elevated TG  4/7-11 [facility] home needs nephrology appt  Feeling OK, Duloxetine still effective, sleeps well, interest good,energy, concentration ok, appetite fine, no suicidal ideation  Anxiety up and down  HPI  The patient is a 79-year-old male who presents today for routine follow-up of multiple medical problems including anxiety depression, history of hypertriglyceridemia, history of allergic rhinitis among other  States that he is doing fairly well  He saw his [facility] provider who started him on fenofibrate for elevated triglycerides  He continues with his duloxetine which remains effective for treating his depression as well as his Klonopin which remains effective for treating his anxiety    He states he sleeps well, his interest level is good as well as ability to concentrate  He notes that his appetite is fine and he has no suicidal ideation  He has had some more anxiety recently due to issues related to his daughter  Also issues related to [employer] though needing to finish some part-time jobs  Denies any cardiovascular pulmonary complaint  The following portions of the patient's history were reviewed and updated as appropriate: allergies, current medications, past family history, past medical history, past social history, past surgical history and problem list     ROS    Per the HPI  Objective:    Physical Exam  Vitals and nursing note reviewed  Constitutional:       Comments: Overweight and in NAD   Neck:      Vascular: No carotid bruit  Comments: No JVD  Cardiovascular:      Rate and Rhythm: Normal rate and regular rhythm  Heart sounds: No murmur heard  Pulmonary:      Effort: Pulmonary effort is normal       Breath sounds: Normal breath sounds  Musculoskeletal:      Comments: Trace edema bilaterally   Lymphadenopathy:      Cervical: No cervical adenopathy  Neurological:      Mental Status: He is alert and oriented to person, place, and time  Psychiatric:         Mood and Affect: Mood normal          Thought Content:  Thought content normal          Judgment: Judgment normal

## 2022-03-24 NOTE — ASSESSMENT & PLAN NOTE
Continue with Klonopin which remains effective at treating his chronic anxiety  He has never had an escalation of dose

## 2022-03-24 NOTE — ASSESSMENT & PLAN NOTE
Continue with citalopram which remains effective at treating his vegetative symptoms of depression  He is not suicidal in overall he feels that his mood is good

## 2022-08-15 ENCOUNTER — RA CDI HCC (OUTPATIENT)
Dept: OTHER | Facility: HOSPITAL | Age: 62
End: 2022-08-15

## 2022-08-15 NOTE — PROGRESS NOTES
[facility]  coding opportunities       Chart reviewed, no opportunity found: CHART REVIEWED, NO OPPORTUNITY FOUND        Patients Insurance        Commercial Insurance: [employer]

## 2022-08-22 ENCOUNTER — OFFICE VISIT (OUTPATIENT)
Dept: FAMILY MEDICINE CLINIC | Facility: CLINIC | Age: 62
End: 2022-08-22
Payer: COMMERCIAL

## 2022-08-22 VITALS
SYSTOLIC BLOOD PRESSURE: 112 MMHG | BODY MASS INDEX: 32.88 KG/M2 | DIASTOLIC BLOOD PRESSURE: 82 MMHG | WEIGHT: 222 LBS | HEIGHT: 69 IN

## 2022-08-22 DIAGNOSIS — E78.2 MIXED HYPERLIPIDEMIA: Primary | ICD-10-CM

## 2022-08-22 DIAGNOSIS — Z11.4 SCREENING FOR HIV (HUMAN IMMUNODEFICIENCY VIRUS): ICD-10-CM

## 2022-08-22 DIAGNOSIS — K21.00 GASTROESOPHAGEAL REFLUX DISEASE WITH ESOPHAGITIS WITHOUT HEMORRHAGE: ICD-10-CM

## 2022-08-22 DIAGNOSIS — F33.42 RECURRENT MAJOR DEPRESSIVE DISORDER, IN FULL REMISSION (HCC): ICD-10-CM

## 2022-08-22 DIAGNOSIS — E55.9 VITAMIN D DEFICIENCY: ICD-10-CM

## 2022-08-22 DIAGNOSIS — Z12.5 SCREENING FOR PROSTATE CANCER: ICD-10-CM

## 2022-08-22 DIAGNOSIS — F41.9 ANXIETY: ICD-10-CM

## 2022-08-22 DIAGNOSIS — G47.33 SLEEP APNEA, OBSTRUCTIVE: ICD-10-CM

## 2022-08-22 DIAGNOSIS — E78.1 ESSENTIAL HYPERTRIGLYCERIDEMIA: ICD-10-CM

## 2022-08-22 PROCEDURE — 36415 COLL VENOUS BLD VENIPUNCTURE: CPT | Performed by: FAMILY MEDICINE

## 2022-08-22 PROCEDURE — 99214 OFFICE O/P EST MOD 30 MIN: CPT | Performed by: FAMILY MEDICINE

## 2022-08-22 RX ORDER — DULOXETIN HYDROCHLORIDE 60 MG/1
60 CAPSULE, DELAYED RELEASE ORAL DAILY
Qty: 30 CAPSULE | Refills: 2 | Status: SHIPPED | OUTPATIENT
Start: 2022-08-22 | End: 2022-09-12 | Stop reason: SDUPTHER

## 2022-08-22 RX ORDER — CLONAZEPAM 0.5 MG/1
0.5 TABLET ORAL 2 TIMES DAILY
Qty: 60 TABLET | Refills: 2 | Status: SHIPPED | OUTPATIENT
Start: 2022-08-22 | End: 2022-09-12 | Stop reason: SDUPTHER

## 2022-08-22 NOTE — PROGRESS NOTES
Assessment/Plan:  Gastroesophageal reflux disease with esophagitis  He continues with p r n  Pepcid  He denies any odynophagia dysphagia anorexia weight loss night sweats [employer]  Sleep apnea, obstructive  [facility]    Vitamin D deficiency  Vitamin-D level in February was borderline but normal   He continues with vitamin-D replacement    Mixed hyperlipidemia  Started on fenofibrate for mild hypertriglyceridemia by the [place]  Will get a lipid profile today  Essential hypertriglyceridemia  Lipid profile today and follow up when results are available  Depression  Continue with Cymbalta  Symptoms well remediated  Anxiety  Continue with Klonopin which remains effective at treating his chronic anxiety  Diagnoses and all orders for this visit:    Mixed hyperlipidemia  -     CBC  -     Comprehensive metabolic panel  -     Lipid panel    Recurrent major depressive disorder, in full remission (HCC)  -     CBC  -     DULoxetine (CYMBALTA) 60 mg delayed release capsule; Take 1 capsule (60 mg total) by mouth daily    Screening for HIV (human immunodeficiency virus)  -     Human Immunodeficiency Virus 1/2 Antigen / Antibody ( Fourth Generation) with Reflex Testing    Screening for prostate cancer    Anxiety  -     clonazePAM (KlonoPIN) 0 5 mg tablet; Take 1 tablet (0 5 mg total) by mouth 2 (two) times a day    Gastroesophageal reflux disease with esophagitis without hemorrhage    Sleep apnea, obstructive    Vitamin D deficiency    Essential hypertriglyceridemia          Subjective:   Chief Complaint   Patient presents with    Follow-up     Med check        Patient ID: Leonel Godwin is a 58 y o  male  I am pretty good  C/w meds and doing well   Fabiola PERKINS  The patient is a 42-year-old male who presents today for routine follow-up of multiple medical problems which include anxiety depression in addition to hyperlipidemia, obstructive sleep apnea as well as gastroesophageal reflux disease followed by the [place]  He is compliant with his Cymbalta as well as Klonopin  He notes that this effectively treats his anxiety and depression  He notes that he appears to sleep fairly well, energy level is improved as well as interest level  He can concentrate, appetite is good and he is not suicidal   He is compliant with fenofibrate started by the [place] for his hypertriglyceridemia  Vitamin-D as well for his history of vitamin-D deficiency  The following portions of the patient's history were reviewed and updated as appropriate: allergies, current medications, past family history, past medical history, past social history, past surgical history and problem list     ROS    For the HPI  Objective:    Physical Exam  Vitals and nursing note reviewed  Constitutional:       Comments: Somewhat overweight but muscular and in no distress   Neck:      Vascular: No carotid bruit  Comments: No JVD  No thyroid enlargement or nodule noted  Cardiovascular:      Rate and Rhythm: Normal rate and regular rhythm  Pulmonary:      Effort: Pulmonary effort is normal       Breath sounds: Normal breath sounds  Musculoskeletal:      Right lower leg: No edema  Left lower leg: No edema  Lymphadenopathy:      Cervical: No cervical adenopathy  Neurological:      General: No focal deficit present  Mental Status: He is alert and oriented to person, place, and time  Psychiatric:         Mood and Affect: Mood normal          Thought Content:  Thought content normal          Judgment: Judgment normal

## 2022-08-22 NOTE — ASSESSMENT & PLAN NOTE
Started on fenofibrate for mild hypertriglyceridemia by the [facility]  Will get a lipid profile today

## 2022-08-22 NOTE — ASSESSMENT & PLAN NOTE
He continues with ani [place]  He denies any odynophagia dysphagia anorexia weight loss night sweats [employer]

## 2022-08-23 LAB
ALBUMIN SERPL-MCNC: 4.7 G/DL (ref 3.6–5.1)
ALBUMIN/GLOB SERPL: 2 (CALC) (ref 1–2.5)
ALP SERPL-CCNC: 91 U/L (ref 35–144)
ALT SERPL-CCNC: 49 U/L (ref 9–46)
AST SERPL-CCNC: 23 U/L (ref 10–35)
BILIRUB SERPL-MCNC: 0.4 MG/DL (ref 0.2–1.2)
BUN SERPL-MCNC: 18 MG/DL (ref 7–25)
BUN/CREAT SERPL: ABNORMAL (CALC) (ref 6–22)
CALCIUM SERPL-MCNC: 9.6 MG/DL (ref 8.6–10.3)
CHLORIDE SERPL-SCNC: 103 MMOL/L (ref 98–110)
CHOLEST SERPL-MCNC: 194 MG/DL
CHOLEST/HDLC SERPL: 4.9 (CALC)
CO2 SERPL-SCNC: 24 MMOL/L (ref 20–32)
CREAT SERPL-MCNC: 0.92 MG/DL (ref 0.7–1.35)
ERYTHROCYTE [DISTWIDTH] IN BLOOD BY AUTOMATED COUNT: 13.4 % (ref 11–15)
GFR/BSA.PRED SERPLBLD CYS-BASED-ARV: 94 ML/MIN/1.73M2
GLOBULIN SER CALC-MCNC: 2.3 G/DL (CALC) (ref 1.9–3.7)
GLUCOSE SERPL-MCNC: 97 MG/DL (ref 65–99)
HCT VFR BLD AUTO: 43.9 % (ref 38.5–50)
HDLC SERPL-MCNC: 40 MG/DL
HGB BLD-MCNC: 14.9 G/DL (ref 13.2–17.1)
HIV 1+2 AB+HIV1 P24 AG SERPL QL IA: NORMAL
LDLC SERPL CALC-MCNC: 106 MG/DL (CALC)
MCH RBC QN AUTO: 30.6 PG (ref 27–33)
MCHC RBC AUTO-ENTMCNC: 33.9 G/DL (ref 32–36)
MCV RBC AUTO: 90.1 FL (ref 80–100)
NONHDLC SERPL-MCNC: 154 MG/DL (CALC)
PLATELET # BLD AUTO: 274 THOUSAND/UL (ref 140–400)
PMV BLD REES-ECKER: 9.1 FL (ref 7.5–12.5)
POTASSIUM SERPL-SCNC: 4.2 MMOL/L (ref 3.5–5.3)
PROT SERPL-MCNC: 7 G/DL (ref 6.1–8.1)
RBC # BLD AUTO: 4.87 MILLION/UL (ref 4.2–5.8)
SODIUM SERPL-SCNC: 137 MMOL/L (ref 135–146)
TRIGL SERPL-MCNC: 363 MG/DL
WBC # BLD AUTO: 5.8 THOUSAND/UL (ref 3.8–10.8)

## 2022-09-12 DIAGNOSIS — F41.9 ANXIETY: ICD-10-CM

## 2022-09-12 DIAGNOSIS — F33.42 RECURRENT MAJOR DEPRESSIVE DISORDER, IN FULL REMISSION (HCC): ICD-10-CM

## 2022-09-12 RX ORDER — DULOXETIN HYDROCHLORIDE 60 MG/1
60 CAPSULE, DELAYED RELEASE ORAL DAILY
Qty: 30 CAPSULE | Refills: 2 | Status: SHIPPED | OUTPATIENT
Start: 2022-09-12

## 2022-09-12 RX ORDER — CLONAZEPAM 0.5 MG/1
0.5 TABLET ORAL 2 TIMES DAILY
Qty: 60 TABLET | Refills: 2 | Status: SHIPPED | OUTPATIENT
Start: 2022-09-12 | End: 2022-10-12 | Stop reason: SDUPTHER

## 2022-10-11 NOTE — PROGRESS NOTES
Assessment/Plan:  Problem List Items Addressed This Visit        Digestive    GERD (gastroesophageal reflux disease)     Stable on Pepcid 20mg BID PRN  Watch GERD diet  Relevant Medications    famotidine (Pepcid) 20 mg tablet       Respiratory    Allergic rhinitis, seasonal     Stable on Claritin p r n  Sleep apnea, obstructive     Management per Sleep Medicine  Continue CPAP  Long-term side effects of benzodiazepines discussed today  Wean Klonopin 0 5m/2 pill by mouth in AM, then 1 pill by mouth at bedtime x 1 month, then 1/2 pill by mouth in AM and 1/2 pill at bedtime  Patient states he currently has 2 months of Klonopin prescription available  CSA signed 10/12/22, RUDS done 10/12/22  Cardiovascular and Mediastinum    Mild mitral regurgitation     Stable  Musculoskeletal and Integument    Degenerative arthritis of finger, right     Patient declines Ortho Hand consult 10/12/2022, but may reconsider in the future  Other    Depression     Stable  Continue Cymbalta 60 mg daily  Patient declines dose increase to Cymbalta 90 mg daily  Smart phone seabs list and counseling list given today  Relevant Medications    busPIRone (BUSPAR) 7 5 mg tablet    Other Relevant Orders    TSH, 3rd generation with Free T4 reflex    Anxiety     Stable  Continue Cymbalta 60 mg daily  Patient declines dose increase to Cymbalta 90 mg daily  Smart phone sebas list and counseling list given today  Long-term side effects of benzodiazepines discussed today  Wean Klonopin 0 5m/2 pill by mouth in AM, then 1 pill by mouth at bedtime x 1 month, then 1/2 pill by mouth in AM and 1/2 pill at bedtime  Patient states he currently has 2 months of Klonopin prescription available  CSA signed 10/12/22, RUDS done 10/12/22             Relevant Medications    clonazePAM (KlonoPIN) 0 5 mg tablet    busPIRone (BUSPAR) 7 5 mg tablet    Other Relevant Orders TSH, 3rd generation with Free T4 reflex    [facility] All Prescribed Meds and Special Instructions    Amphetamines, Methamphetamines    Butalbital    Phenobarbital    Secobarbital    Temazepam    Alprazolam    Clonazepam    Diazepam    Lorazepam    Oxazepam    Gabapentin    Pregabalin    Cocaine    Heroin    Buprenorphine    Levorphanol    Meperidine    Naltrexone    Fentanyl    Methadone    Oxycodone    Oxymorphone    Tapentadol    THC    Tramadol    Codeine, Hydrocodone, Hydropmorphone, Morphine    Bath Salts    Ethyl Glucuronide/Ethyl Sulfate    Kratom    Spice    Methylphenidate    Phentermine    Validity Oxidant    Validity Creatinine    Validity pH    Validity Specific    Mixed hyperlipidemia     Pending labs  Patient will likely need statin  [facility] - [facility] declines dose increased today, but may need in future  Recommend lifestyle modifications  The 10-year ASCVD risk score (Katiuska Roe et al , 2013) is: 16 1%    Values used to calculate the score:      Age: 58 years      Sex: Male      Is Non- : No      Diabetic: No      Tobacco smoker: Yes      Systolic Blood Pressure: 101 mmHg      Is BP treated: No      HDL Cholesterol: 40 mg/dL      Total Cholesterol: 194 mg/dL           Relevant Orders    TSH, 3rd generation with Free T4 reflex    Obesity     Worsening  Recommend lifestyle modifications  Relevant Orders    TSH, 3rd generation with Free T4 reflex    History of colon polyps     Colonoscopy is up-to-date  Cigar smoker     Contemplative  Cigar smoking cessation advised  Chewing tobacco use     Contemplative  Chewing tobacco cessation advised  Controlled substance agreement signed     Stable  Continue Cymbalta 60 mg daily  Patient declines dose increase to Cymbalta 90 mg daily  Smart phone sebas list and counseling list given today  Long-term side effects of benzodiazepines discussed today      Wean Klonopin 0 5m/2 pill by mouth in AM, then 1 pill by mouth at bedtime x 1 month, then 1/2 pill by mouth in AM and 1/2 pill at bedtime  Patient states he currently has 2 months of Klonopin prescription available  CSA signed 10/12/22, RUDS done 10/12/22  Essential hypertriglyceridemia     Continue [facility]ofibrate [facility]mg [facility]D [facility] - patient declines dose increased today, but may need in future  Recommend lifestyle modifications  Relevant Orders    TSH, 3rd generation with Free T4 reflex    Vitamin D deficiency     Pending labs  Continue vitamin-D per [facility]-dose unknown  Start multivitamin  Relevant Orders    Comprehensive metabolic panel    Vitamin D 25 hydroxy    Tinnitus     Follow-up with ENT p r n  Other Visit Diagnoses     Annual physical exam    -  Primary    Screening for prostate cancer        PSA checked yearly by [place]  Screening for diabetes mellitus        Relevant Orders    Hemoglobin A1C    Screening for cardiovascular condition        Relevant Orders    Comprehensive metabolic panel    Lipid panel    LDL cholesterol, direct    Encounter for immunization        Relevant Orders    Pneumococcal Conjugate Vaccine 20-valent (Pcv20) (Completed)    influenza vaccine, quadrivalent, recombinant, PF, 0 5 mL, for patients 18 yr+ (FLUBLOK) (Completed)    Obesity (BMI 30 0-34  9)        Relevant Orders    TSH, 3rd generation with Free T4 reflex    BMI 33 0-33 9,adult        Relevant Orders    TSH, 3rd generation with Free T4 reflex           Return in about 6 weeks (around 2022) for 40min - F/U HL, Anx/Dep, Labs  Future Appointments   Date Time Provider Bradford Finn   2022  2:40 PM Yoav Gerardo DO [facility]        Subjective:     Noelle Cruz is a 58 y o  male who presents today as a new patient for his medical conditions        New Patient    Previous PCP:  Dr Wade Staples at [address] [place] [address]  Reason for Transfer:  PCP Retired  Last seen by previous PCP:  22  Last Labs:  22  [facility]   Medical Records Requested:  No      HPI:  Chief Complaint   Patient presents with   • Annual Exam     -- Above per clinical staff and reviewed  --      HPI      Today:      Controlled Substance Review    [place] PDMP or [place]  reviewed: No red flags were identified; safe to proceed with prescription  [facility] Dr Froilan Taylor - Next due   Has PSA done yearly by [place]  Declines LENARD  Obesity - Not watching diet  +Exercise - Rides Motorcross bike for 30 minutes, 2 times per week  Previously ran  Hyperlipidemia / Hypertriglyceridemia - On Fenofibrate 48mg [facility]D [facility] since   No other cholesterol Rx previously  GERD - Stable on Pepcid OTC PRN  Watching GERD diet  No EGD previously  TIMA - Management per [place] [facility]  Next appt   Using CPAP nightly  Anxiety / Depression - Stable  On Klonopin 0 5mg BID - last Rx 22 by prior PCP - last dose 6:00am, On Cymbalta 60mg QD  Good social supports  No SI/HI/AH/VH  No counseling  Previously on Ativan  No higher doses or other mood meds previously  PHQ-2/9 Depression Screening    Little interest or pleasure in doing things: 0 - not at all  Feeling down, depressed, or hopeless: 1 - several days  Trouble falling or staying asleep, or sleeping too much: 0 - not at all  Feeling tired or having little energy: 1 - several days  Poor appetite or overeatin - not at all  Feeling bad about yourself - or that you are a failure or have let yourself or your family down: 0 - not at all  Trouble concentrating on things, such as reading the newspaper or watching television: 0 - not at all  Moving or speaking so slowly that other people could have noticed   Or the opposite - being so fidgety or restless that you have been moving around a lot more than usual: 0 - not at all  Thoughts that you would be better off dead, or of hurting yourself in some way: 0 - not at all  PHQ-9 Score: 2   PHQ-9 Interpretation: No or Minimal depression          NENA-7 Flowsheet Screening    Flowsheet Row Most Recent Value   Over the last 2 weeks, how often have you been bothered by any of the following problems? Feeling nervous, anxious, or on edge 1   Not being able to stop or control worrying 0   Worrying too much about different things 0   Trouble relaxing 1   Being so restless that it is hard to sit still 1   Becoming easily annoyed or irritable 0   Feeling afraid as if something awful might happen 0   NENA-7 Total Score 3        MDQ:  0, Asynchronous, No Problem    Vitamin D Deficiency - On Rx Vitamin D dose unknown per [place]  Not taking MVI  AR - Stable on Claritin PRN  B/L Tinnitus - Previously seen by ENT  F/U PRN  He never got hearing aids  He wears ear protection when hunting  Right Middle Finger OA - Can't flex finger  Declines Ortho Hand consult 10/12/22, but may reconsider in future  H/O Colon Polyps - Last colonoscopy 6/30/21, next due 6/29/26  Cigar Smoker / Chews Tobacco - Contemplative  Reviewed:  Labs 8/22/22    No Uro  Has PSA done yearly by [place]  Declines LENARD  Sees Dentist q6 months  Sees Optho q2 years  The following portions of the patient's history were reviewed and updated as appropriate: allergies, current medications, past family history, past medical history, past social history, past surgical history and problem list       Review of Systems   Constitutional: Positive for fatigue  Negative for appetite change, chills, diaphoresis and fever  Respiratory: Negative for chest tightness and shortness of breath  Cardiovascular: Negative for chest pain  Gastrointestinal: Negative for abdominal pain, blood in stool, diarrhea, nausea and vomiting  Genitourinary: Negative for dysuria          No nocturia        Current Outpatient Medications   Medication Sig Dispense Refill   • busPIRone (BUSPAR) 7 5 mg tablet 1 pill by mouth twice daily, with 3rd dose once as needed for anxiety  90 tablet 1   • Cholecalciferol 50 MCG (2000 UT) TABS Take 1 tablet by mouth daily [facility]     • clonazePAM (KlonoPIN) 0 5 mg tablet 1/2 tab by mouth in AM, 1 tab by mouth at night  60 tablet 2   • DULoxetine (CYMBALTA) 60 mg delayed release capsule Take 1 capsule (60 mg total) by mouth daily (Patient taking differently: Take 60 mg by mouth daily) 30 capsule 2   • famotidine (Pepcid) 20 mg tablet Take 20 mg by mouth 2 (two) times a day as needed for heartburn OTC     • fenofibrate (TRICOR) 48 mg tablet Take 48 mg by mouth daily [facility]     • loratadine (CLARITIN) 10 mg tablet Take 1 tablet by mouth as needed   (Patient not taking: Reported on 10/12/2022)       No current facility-administered medications for this visit  Objective:  /84   Pulse 70   Temp 97 5 °F (36 4 °C)   Resp 16   Ht 5' 9" (1 753 m)   Wt 102 kg (225 lb)   SpO2 97%   BMI 33 23 kg/m²    Wt Readings from Last 3 Encounters:   10/12/22 102 kg (225 lb)   08/22/22 101 kg (222 lb)   03/24/22 98 4 kg (217 lb)      BP Readings from Last 3 Encounters:   10/12/22 124/84   08/22/22 112/82   03/24/22 130/80          Physical Exam  Vitals and nursing note reviewed  Constitutional:       Appearance: Normal appearance  He is well-developed  He is obese  HENT:      Head: Normocephalic and atraumatic  Right Ear: Tympanic membrane, ear canal and external ear normal       Left Ear: Tympanic membrane, ear canal and external ear normal       Nose: Nose normal       Right Sinus: No maxillary sinus tenderness or frontal sinus tenderness  Left Sinus: No maxillary sinus tenderness or frontal sinus tenderness  Mouth/Throat:      Mouth: Mucous membranes are moist       Pharynx: Oropharynx is clear  Uvula midline  Tonsils: No tonsillar exudate  Eyes:      Extraocular Movements: Extraocular movements intact  Conjunctiva/sclera: Conjunctivae normal       Pupils: Pupils are equal, round, and reactive to light  Cardiovascular:      Rate and Rhythm: Normal rate and regular rhythm  Pulses: Normal pulses  Heart sounds: Murmur heard  Systolic murmur is present with a grade of 2/6  Pulmonary:      Effort: Pulmonary effort is normal       Breath sounds: Normal breath sounds  Abdominal:      General: Bowel sounds are normal  There is no distension  Palpations: Abdomen is soft  There is no mass  Tenderness: There is no abdominal tenderness  There is no guarding or rebound  Musculoskeletal:         General: No swelling or tenderness  Cervical back: Neck supple  Right lower leg: No edema  Left lower leg: No edema  Lymphadenopathy:      Cervical: No cervical adenopathy  Skin:     Findings: No rash  Neurological:      General: No focal deficit present  Mental Status: He is alert and oriented to person, place, and time  Psychiatric:         Mood and Affect: Mood normal          Behavior: Behavior normal          Thought Content: Thought content normal          Judgment: Judgment normal          Lab Results:      Lab Results   Component Value Date    WBC 5 8 08/22/2022    HGB 14 9 08/22/2022    HCT 43 9 08/22/2022     08/22/2022    CHOL 193 09/15/2016    TRIG 363 (H) 08/22/2022    HDL 40 08/22/2022    ALT 49 (H) 08/22/2022    AST 23 08/22/2022     09/15/2016    K 4 2 08/22/2022     08/22/2022    CREATININE 0 92 08/22/2022    BUN 18 08/22/2022    CO2 24 08/22/2022    HGBA1C 5 4 01/20/2021     No results found for: URICACID  Invalid input(s): BASENAME Vitamin D    No results found  POCT Labs      BMI Counseling: Body mass index is 33 23 kg/m²  The BMI is above normal  Nutrition recommendations include encouraging healthy choices of fruits and vegetables  Exercise recommendations include exercising 3-5 times per week  No pharmacotherapy was ordered  Rationale for BMI follow-up plan is due to patient being overweight or obese  Tobacco Cessation Counseling: Tobacco cessation counseling was provided   The patient is sincerely urged to quit consumption of tobacco  He is not ready to quit tobacco

## 2022-10-12 ENCOUNTER — OFFICE VISIT (OUTPATIENT)
Dept: FAMILY MEDICINE CLINIC | Facility: CLINIC | Age: 62
End: 2022-10-12
Payer: COMMERCIAL

## 2022-10-12 ENCOUNTER — TELEPHONE (OUTPATIENT)
Dept: ADMINISTRATIVE | Facility: OTHER | Age: 62
End: 2022-10-12

## 2022-10-12 VITALS
DIASTOLIC BLOOD PRESSURE: 84 MMHG | BODY MASS INDEX: 33.33 KG/M2 | WEIGHT: 225 LBS | SYSTOLIC BLOOD PRESSURE: 124 MMHG | OXYGEN SATURATION: 97 % | HEIGHT: 69 IN | TEMPERATURE: 97.5 F | RESPIRATION RATE: 16 BRPM | HEART RATE: 70 BPM

## 2022-10-12 DIAGNOSIS — E78.2 MIXED HYPERLIPIDEMIA: ICD-10-CM

## 2022-10-12 DIAGNOSIS — Z13.1 SCREENING FOR DIABETES MELLITUS: ICD-10-CM

## 2022-10-12 DIAGNOSIS — J30.1 SEASONAL ALLERGIC RHINITIS DUE TO POLLEN: ICD-10-CM

## 2022-10-12 DIAGNOSIS — K21.00 GASTROESOPHAGEAL REFLUX DISEASE WITH ESOPHAGITIS WITHOUT HEMORRHAGE: ICD-10-CM

## 2022-10-12 DIAGNOSIS — E55.9 VITAMIN D DEFICIENCY: ICD-10-CM

## 2022-10-12 DIAGNOSIS — H93.13 TINNITUS OF BOTH EARS: ICD-10-CM

## 2022-10-12 DIAGNOSIS — F41.9 ANXIETY: ICD-10-CM

## 2022-10-12 DIAGNOSIS — Z72.0 CHEWING TOBACCO USE: ICD-10-CM

## 2022-10-12 DIAGNOSIS — E66.9 OBESITY (BMI 30.0-34.9): ICD-10-CM

## 2022-10-12 DIAGNOSIS — Z00.00 ANNUAL PHYSICAL EXAM: Primary | ICD-10-CM

## 2022-10-12 DIAGNOSIS — Z86.010 HISTORY OF COLON POLYPS: ICD-10-CM

## 2022-10-12 DIAGNOSIS — I34.0 MILD MITRAL REGURGITATION: ICD-10-CM

## 2022-10-12 DIAGNOSIS — F17.290 CIGAR SMOKER: ICD-10-CM

## 2022-10-12 DIAGNOSIS — F33.42 RECURRENT MAJOR DEPRESSIVE DISORDER, IN FULL REMISSION (HCC): ICD-10-CM

## 2022-10-12 DIAGNOSIS — M19.041 DEGENERATIVE ARTHRITIS OF FINGER, RIGHT: ICD-10-CM

## 2022-10-12 DIAGNOSIS — Z12.5 SCREENING FOR PROSTATE CANCER: ICD-10-CM

## 2022-10-12 DIAGNOSIS — Z23 ENCOUNTER FOR IMMUNIZATION: ICD-10-CM

## 2022-10-12 DIAGNOSIS — Z79.899 CONTROLLED SUBSTANCE AGREEMENT SIGNED: ICD-10-CM

## 2022-10-12 DIAGNOSIS — G47.33 SLEEP APNEA, OBSTRUCTIVE: ICD-10-CM

## 2022-10-12 DIAGNOSIS — E78.1 ESSENTIAL HYPERTRIGLYCERIDEMIA: ICD-10-CM

## 2022-10-12 DIAGNOSIS — Z13.6 SCREENING FOR CARDIOVASCULAR CONDITION: ICD-10-CM

## 2022-10-12 DIAGNOSIS — E66.9 CLASS 1 OBESITY WITH SERIOUS COMORBIDITY AND BODY MASS INDEX (BMI) OF 33.0 TO 33.9 IN ADULT, UNSPECIFIED OBESITY TYPE: ICD-10-CM

## 2022-10-12 PROBLEM — K21.9 GASTROESOPHAGEAL REFLUX DISEASE: Status: RESOLVED | Noted: 2021-10-22 | Resolved: 2022-10-12

## 2022-10-12 PROBLEM — R06.83 SNORING: Status: RESOLVED | Noted: 2021-10-22 | Resolved: 2022-10-12

## 2022-10-12 PROCEDURE — 99213 OFFICE O/P EST LOW 20 MIN: CPT | Performed by: FAMILY MEDICINE

## 2022-10-12 PROCEDURE — 90682 RIV4 VACC RECOMBINANT DNA IM: CPT

## 2022-10-12 PROCEDURE — 90677 PCV20 VACCINE IM: CPT

## 2022-10-12 PROCEDURE — 90472 IMMUNIZATION ADMIN EACH ADD: CPT

## 2022-10-12 PROCEDURE — 99396 PREV VISIT EST AGE 40-64: CPT | Performed by: FAMILY MEDICINE

## 2022-10-12 PROCEDURE — 90471 IMMUNIZATION ADMIN: CPT

## 2022-10-12 RX ORDER — FAMOTIDINE 20 MG/1
20 TABLET, FILM COATED ORAL 2 TIMES DAILY PRN
COMMUNITY

## 2022-10-12 RX ORDER — BUSPIRONE HYDROCHLORIDE 7.5 MG/1
TABLET ORAL
Qty: 90 TABLET | Refills: 1 | Status: SHIPPED | OUTPATIENT
Start: 2022-10-12

## 2022-10-12 RX ORDER — CLONAZEPAM 0.5 MG/1
TABLET ORAL
Qty: 60 TABLET | Refills: 2
Start: 2022-10-12

## 2022-10-12 NOTE — ASSESSMENT & PLAN NOTE
Pending labs  Patient will likely need statin  [facility]ate [facility]mg [facility] - patient declines dose increased today, but may need in future  Recommend lifestyle modifications      The 10-year ASCVD risk score (Lizeth Zimmerman et al , 2013) is: 16 1%    Values used to calculate the score:      Age: 58 years      Sex: Male      Is Non- : No      Diabetic: No      Tobacco smoker: Yes      Systolic Blood Pressure: 968 mmHg      Is BP treated: No      HDL Cholesterol: 40 mg/dL      Total Cholesterol: 194 mg/dL

## 2022-10-12 NOTE — ASSESSMENT & PLAN NOTE
Stable  Continue Cymbalta 60 mg daily  Patient declines dose increase to Cymbalta 90 mg daily  Smart phone sebas list and counseling list given today

## 2022-10-12 NOTE — ASSESSMENT & PLAN NOTE
Stable  Continue Cymbalta 60 mg daily  Patient declines dose increase to Cymbalta 90 mg daily  Smart phone sebas list and counseling list given today  Long-term side effects of benzodiazepines discussed today  Wean Klonopin 0 5m/2 pill by mouth in AM, then 1 pill by mouth at bedtime x 1 month, then 1/2 pill by mouth in AM and 1/2 pill at bedtime  Patient states he currently has 2 months of Klonopin prescription available  CSA signed 10/12/22, GILBERT done 10/12/22

## 2022-10-12 NOTE — ASSESSMENT & PLAN NOTE
Management per Sleep Medicine  Continue CPAP  Long-term side effects of benzodiazepines discussed today  Wean Klonopin 0 5m/2 pill by mouth in AM, then 1 pill by mouth at bedtime x 1 month, then 1/2 pill by mouth in AM and 1/2 pill at bedtime  Patient states he currently has 2 months of Klonopin prescription available  CSA signed 10/12/22, GLIBERT done 10/12/22

## 2022-10-12 NOTE — ASSESSMENT & PLAN NOTE
[facility]ofibrate [facility]mg [facility] - patient declines dose increased today, but may need in future  Recommend lifestyle modifications

## 2022-10-12 NOTE — TELEPHONE ENCOUNTER
----- Message from Remo Nyhan, DO sent at 10/12/2022  9:47 AM EDT -----  Regarding: [facility]  [facility] [place], [place]  10/12/22 9:48 AM    Hello, our patient Padmini Rocha has had Immunization(s) completed/performed  Please assist in updating the patient chart by making an External outreach to  facility located in   The date of service is       Shingrix x 2  in 2020 - [place] in [place], [place]    Thank you,  Misha Yun  PG  Lala [place]te

## 2022-10-12 NOTE — PROGRESS NOTES
Assessment/Plan:  Problem List Items Addressed This Visit        Digestive    GERD (gastroesophageal reflux disease)     Stable on Pepcid 20mg BID PRN  Watch GERD diet  Relevant Medications    famotidine (Pepcid) 20 mg tablet       Respiratory    Allergic rhinitis, seasonal     Stable on Claritin p r n  Sleep apnea, obstructive     Management per Sleep Medicine  Continue CPAP  Long-term side effects of benzodiazepines discussed today  Wean Klonopin 0 5m/2 pill by mouth in AM, then 1 pill by mouth at bedtime x 1 month, then 1/2 pill by mouth in AM and 1/2 pill at bedtime  Patient states he currently has 2 months of Klonopin prescription available  CSA signed 10/12/22, RUDS done 10/12/22  Cardiovascular and Mediastinum    Mild mitral regurgitation     Stable  Musculoskeletal and Integument    Degenerative arthritis of finger, right     Patient declines Ortho Hand consult 10/12/2022, but may reconsider in the future  Other    Depression     Stable  Continue Cymbalta 60 mg daily  Patient declines dose increase to Cymbalta 90 mg daily  Smart phone sebas list and counseling list given today  Relevant Medications    busPIRone (BUSPAR) 7 5 mg tablet    Other Relevant Orders    TSH, 3rd generation with Free T4 reflex    Anxiety     Stable  Continue Cymbalta 60 mg daily  Patient declines dose increase to Cymbalta 90 mg daily  Smart phone sebas list and counseling list given today  Long-term side effects of benzodiazepines discussed today  Wean Klonopin 0 5m/2 pill by mouth in AM, then 1 pill by mouth at bedtime x 1 month, then 1/2 pill by mouth in AM and 1/2 pill at bedtime  Patient states he currently has 2 months of Klonopin prescription available  CSA signed 10/12/22, RUDS done 10/12/22             Relevant Medications    clonazePAM (KlonoPIN) 0 5 mg tablet    busPIRone (BUSPAR) 7 5 mg tablet    Other Relevant Orders TSH, 3rd generation with Free T4 reflex    [facility] All Prescribed Meds and Special Instructions    Amphetamines, Methamphetamines    Butalbital    Phenobarbital    Secobarbital    Temazepam    Alprazolam    Clonazepam    Diazepam    Lorazepam    Oxazepam    Gabapentin    Pregabalin    Cocaine    Heroin    Buprenorphine    Levorphanol    Meperidine    Naltrexone    Fentanyl    Methadone    Oxycodone    Oxymorphone    Tapentadol    THC    Tramadol    Codeine, Hydrocodone, Hydropmorphone, Morphine    Bath Salts    Ethyl Glucuronide/Ethyl Sulfate    Kratom    Spice    Methylphenidate    Phentermine    Validity Oxidant    Validity Creatinine    Validity pH    Validity Specific    Mixed hyperlipidemia     Pending labs  Patient will likely need statin  Continue [facility]ibrate [facility]mg [facility]D [facility] - patient declines dose increased today, but may need in future  Recommend lifestyle modifications  The 10-year ASCVD risk score (Angelita Zavala et al , 2013) is: 16 1%    Values used to calculate the score:      Age: 58 years      Sex: Male      Is Non- : No      Diabetic: No      Tobacco smoker: Yes      Systolic Blood Pressure: 672 mmHg      Is BP treated: No      HDL Cholesterol: 40 mg/dL      Total Cholesterol: 194 mg/dL           Relevant Orders    TSH, 3rd generation with Free T4 reflex    Obesity     Worsening  Recommend lifestyle modifications  Relevant Orders    TSH, 3rd generation with Free T4 reflex    History of colon polyps     Colonoscopy is up-to-date  Cigar smoker     Contemplative  Cigar smoking cessation advised  Chewing tobacco use     Contemplative  Chewing tobacco cessation advised  Controlled substance agreement signed     Stable  Continue Cymbalta 60 mg daily  Patient declines dose increase to Cymbalta 90 mg daily  Smart phone sebas list and counseling list given today  Long-term side effects of benzodiazepines discussed today      Wean Klonopin 0 5m/2 pill by mouth in AM, then 1 pill by mouth at bedtime x 1 month, then 1/2 pill by mouth in AM and 1/2 pill at bedtime  Patient states he currently has 2 months of Klonopin prescription available  CSA signed 10/12/22, RUDS done 10/12/22  Essential hypertriglyceridemia     Continue Fenofibrate 48mg QD [facility] - patient declines dose increased today, but may need in future  Recommend lifestyle modifications  Relevant Orders    TSH, 3rd generation with Free T4 reflex    Vitamin D deficiency     Pending labs  Continue vitamin-D per [facility]-dose unknown  Start multivitamin  Relevant Orders    Comprehensive metabolic panel    Vitamin D 25 hydroxy    Tinnitus     Follow-up with ENT p r n  Other Visit Diagnoses     Annual physical exam    -  Primary    Screening for prostate cancer        Screening for diabetes mellitus        Relevant Orders    Hemoglobin A1C    Screening for cardiovascular condition        Relevant Orders    Comprehensive metabolic panel    Lipid panel    LDL cholesterol, direct    Encounter for immunization        Relevant Orders    Pneumococcal Conjugate Vaccine 20-valent (Pcv20) (Completed)    influenza vaccine, quadrivalent, recombinant, PF, 0 5 mL, for patients 18 yr+ (FLUBLOK) (Completed)    Obesity (BMI 30 0-34  9)        Relevant Orders    TSH, 3rd generation with Free T4 reflex    BMI 33 0-33 9,adult        Relevant Orders    TSH, 3rd generation with Free T4 reflex           Return in about 6 weeks (around 2022) for 40min - F/U HL, Anx/Dep, Labs  Future Appointments   Date Time Provider Bradford Finn   2022  2:40 PM Tong Vegas DO [facility]        Subjective:     Kyra Zuniga is a 58 y o  male who presents today for a follow-up on his chronic medical conditions  HPI:  Chief Complaint   Patient presents with   • Annual Exam     -- Above per clinical staff and reviewed  --      HPI      Today:      Physical    Not watching diet  No exercise  H/O Colon Polyps - Last colonoscopy 6/30/21, next due 6/29/26        No Uro  Has PSA done yearly by [place]  Declines LENARD            Sees Dentist q6 months  Sees Optho q2 years  Health Maintenance   Topic Date Due   • COVID-19 Vaccine (3 - Booster for Moderna series) 01/12/2023 (Originally 8/25/2021)   • BMI: Followup Plan  10/12/2023   • BMI: Adult  10/12/2023   • Annual Physical  10/12/2023   • Colorectal Cancer Screening  06/29/2026   • DTaP,Tdap,and Td Vaccines (2 - Td or Tdap) 01/11/2029   • HIV Screening  Completed   • Hepatitis C Screening  Completed   • Pneumococcal Vaccine: Pediatrics (0 to 5 Years) and At-Risk Patients (6 to 59 Years)  Completed   • Influenza Vaccine  Completed   • HIB Vaccine  Aged Out   • Hepatitis B Vaccine  Aged Out   • IPV Vaccine  Aged Out   • Hepatitis A Vaccine  Aged Out   • Meningococcal ACWY Vaccine  Aged Out   • HPV Vaccine  Aged Out             The following portions of the patient's history were reviewed and updated as appropriate: allergies, current medications, past family history, past medical history, past social history, past surgical history and problem list       Review of Systems     See other note  Current Outpatient Medications   Medication Sig Dispense Refill   • busPIRone (BUSPAR) 7 5 mg tablet 1 pill by mouth twice daily, with 3rd dose once as needed for anxiety  90 tablet 1   • Cholecalciferol 50 MCG (2000 UT) TABS Take 1 tablet by mouth daily [facility]     • clonazePAM (KlonoPIN) 0 5 mg tablet 1/2 tab by mouth in AM, 1 tab by mouth at night   60 tablet 2   • DULoxetine (CYMBALTA) 60 mg delayed release capsule Take 1 capsule (60 mg total) by mouth daily (Patient taking differently: Take 60 mg by mouth daily) 30 capsule 2   • famotidine (Pepcid) 20 mg tablet Take 20 mg by mouth 2 (two) times a day as needed for heartburn OTC     • fenofibrate (TRICOR) 48 mg tablet Take 48 mg by mouth daily Rx [facility]     • loratadine (CLARITIN) 10 mg tablet Take 1 tablet by mouth as needed   (Patient not taking: Reported on 10/12/2022)       No current facility-administered medications for this visit  Objective:  /84   Pulse 70   Temp 97 5 °F (36 4 °C)   Resp 16   Ht 5' 9" (1 753 m)   Wt 102 kg (225 lb)   SpO2 97%   BMI 33 23 kg/m²    Wt Readings from Last 3 Encounters:   10/12/22 102 kg (225 lb)   08/22/22 101 kg (222 lb)   03/24/22 98 4 kg (217 lb)      BP Readings from Last 3 Encounters:   10/12/22 124/84   08/22/22 112/82   03/24/22 130/80          Physical Exam     Vitals and nursing note reviewed  Constitutional:       Appearance: Normal appearance  He is well-developed  He is obese  HENT:      Head: Normocephalic and atraumatic  Right Ear: Tympanic membrane, ear canal and external ear normal       Left Ear: Tympanic membrane, ear canal and external ear normal       Nose: Nose normal       Right Sinus: No maxillary sinus tenderness or frontal sinus tenderness  Left Sinus: No maxillary sinus tenderness or frontal sinus tenderness  Mouth/Throat:      Mouth: Mucous membranes are moist       Pharynx: Oropharynx is clear  Uvula midline  Tonsils: No tonsillar exudate  Eyes:      Extraocular Movements: Extraocular movements intact  Conjunctiva/sclera: Conjunctivae normal       Pupils: Pupils are equal, round, and reactive to light  Cardiovascular:      Rate and Rhythm: Normal rate and regular rhythm  Pulses: Normal pulses  Heart sounds: Murmur heard  Systolic murmur is present with a grade of 2/6  Pulmonary:      Effort: Pulmonary effort is normal       Breath sounds: Normal breath sounds  Abdominal:      General: Bowel sounds are normal  There is no distension  Palpations: Abdomen is soft  There is no mass  Tenderness: There is no abdominal tenderness  There is no guarding or rebound     Musculoskeletal:         General: No swelling or tenderness  Cervical back: Neck supple  Right lower leg: No edema  Left lower leg: No edema  Lymphadenopathy:      Cervical: No cervical adenopathy  Skin:     Findings: No rash  Neurological:      General: No focal deficit present  Mental Status: He is alert and oriented to person, place, and time  Psychiatric:         Mood and Affect: Mood normal          Behavior: Behavior normal          Thought Content: Thought content normal          Judgment: Judgment normal      Lab Results:      Lab Results   Component Value Date    WBC 5 8 08/22/2022    HGB 14 9 08/22/2022    HCT 43 9 08/22/2022     08/22/2022    CHOL 193 09/15/2016    TRIG 363 (H) 08/22/2022    HDL 40 08/22/2022    ALT 49 (H) 08/22/2022    AST 23 08/22/2022     09/15/2016    K 4 2 08/22/2022     08/22/2022    CREATININE 0 92 08/22/2022    BUN 18 08/22/2022    CO2 24 08/22/2022    HGBA1C 5 4 01/20/2021     No results found for: URICACID  Invalid input(s): BASENAME Vitamin D    No results found       POCT Labs

## 2022-10-12 NOTE — TELEPHONE ENCOUNTER
Upon review of the In Basket request and the patient's chart, initial outreach has been made via fax, please see Contacts section for details       Thank you  Pearson Dubin

## 2022-10-12 NOTE — LETTER
Vaccination Request Form: Zoster      Date Requested: 10/12/22  Patient:  Shukla  Patient : 1960   Referring Provider: Lauren Terry DO       The above patient has informed us that they have had their   most recent Zoster administered at your facility  Please   complete this form and attach all corresponding documentation  Date of Vaccine(s) Given  ______________________________    Lot Number(s) _______________________________________    Manufacture(s) ______________________________________    Dose Amount (s) _____________________________________    Expiration Date(s) ____________________________________    Comments __________________________________________________________  ____________________________________________________________________  ____________________________________________________________________  ____________________________________________________________________    Administering Facility  ________________________________________________    Vaccine Administered By (print name) ___________________________________      Form Completed By (print name) _______________________________________      Signature ___________________________________________________________      These reports are needed for  compliance  Please fax this completed form and a copy of the Vaccine Document(s) to our office located at [place] [address] as soon as possible to [phone] travis Cuevas: Phone [phone]    We thank you for your assistance in treating our mutual patient

## 2022-10-12 NOTE — PATIENT INSTRUCTIONS
Wean Klonopin 0 5m/2 pill by mouth in AM, then 1 pill by mouth at bedtime x 1 month, then 1/2 pill by mouth in AM and 1/2 pill at bedtime  Refer to the back of insurance card for counseling options  Apps and Websites for Counseling, Anxiety/Depression, Chronic Pain, Lifestyle Change, Problem-solving, Self-Esteem, Anger Management, Coping with Uncertainty    (Prices current as of 19)    Mood Kit - Available in Apple Govind Store for $4 99  Supports a person's success in specific situations, includes thought , mood tracker, and journal   Can be accessed in an unstructured way and used as an unguided self-help govind  2   Moodnotes - Available in Apple Govind Store for $4 99  Focuses on healthier thinking habits and identifying "traps" in thinking  Tracks mood over a period of time and identifies factors that influence mood  3   MoodMission - Available in [facility] for free  Includes five "missions" to promote confidence in handling stressors and coping in specific situations  The govind personalizes its style and techniques based on when the user engages it most frequently  In-govind rewards are used to motivate, increase fun and self-confidence  Helpful for patients who need improvement in mood or a decrease in anxiety and depression symptoms  4    What's Up - Available in [employer] for free  Recognizes negative thinking patterns and methods to overcome them  Uses helpful metaphors, a catastrophe scale, grounding techniques, and breathing exercises  Has the ability to sync data across multiple devices and protect this information with a unique pass code  Has the capability to be active in forums where people can discuss similar feelings and strategies that have been helpful  5   Moodpath - Available in [employer] for free  Uses daily screenings to create a better understanding of thoughts, feelings, and emotions  When needed, it provides a discussion guide to talking with a medical professional based on the responses to daily screenings  Includes over 150 psychological exercises and videos to promote and strengthen overall mental health  6   MindShift - Available in [employer] for free  Helpful for youth and young adults in coping with anxiety  Creates an individualized toolbox to help users deal with test anxiety, perfectionism, social anxiety, worry, panic, and conflict  Includes directions on how to construct “belief experiments” to trial common beliefs that feed anxiety, guided relaxation, as well as tools and tips to help establish and accomplish goals  Differentiates between helpful and unhelpful anxiety, and explains how to overcome fears by gradually facing them in manageable steps  7   CBT-I  - Available in [employer] for free  For insomnia  Educates about sleep, developing positive sleep routines, and improved sleep environment  Encourages user to change behaviors which will provide confidence for better sleep on a regular basis  8   [employer]  - Free website  Provides self-help and therapy resources that encourages change to combat negative and destructive thought patterns  Includes access to numerous handouts on a variety of symptoms related to anxiety, depression, low self-esteem, panic attacks, social disorders, and more  The solution section has interventions that can be printed and saved for future use  9   Woebot - Available in [employer] for free  Recommended for age 16+  Friendly self-care  that helps you think through situations with step-by-step guidance using counseling tools, learn about yourself with intelligent mood tracking, and master skills to reduce stress and live happier through 100+ evidence-based stories from clinicians  Chat as often or as little as you'd like, whenever you'd like to  10   Delvin:  AI  CBT DBT Chatbot - Available in Apple appsstore and Google Play for free, has in-govind purchases  Recommended for 12+  Psychologist support at $30/month, 50% off to start  Zuly Bazzi / Cierra Wood is free  Uses techniques of CBT, DBT, yoga, and meditation to support your needs regarding stress, anxiety, sleep, loss, and a full range of other mental health and wellness issues  11   Curable Pain Relief - Available in Apple Govind store and Google Play for free, has in-govind purchases  Teaches about the chronic pain cycle and how to reverse it, while retraining your brain and nervous system for long-term results  Smart  Charlene guides user through updates in pain science to identify, target, eliminate the factors that are keeping user "stuck" in the pain cycle  12   Talkspace Online Therapy - Available in Apple Govind store and Google Play for a fee  Subscription service, starting at $59/week (billed monthly)  All plans include unlimited messaging, and add live video sessions if desired  Unlimited messaging therapy for teens ages 15-14 and special services for couples therapy  You can change therapists or stop subscription renewal at any time  Recommended for 13+  User is matched with a licensed therapist in your state and communicate on your device by text, audio, and video from anywhere, at any time  User will hear back at least once a day, 5 days per week        Counseling services:    [facility]   82 [facility], 939 [facility], [facility], 120 [facility]  [phone]    [address] [place] [address] (they have equine therapy too)  [address] [address],  [facility], 120 [facility]  [address], [address],  [place], [address]  [place] [address]   70 [facility], [address] [place] [address] [facility]  200 [facility], [address]  [place], [facility] Neel [address] [place] [address] [facility]   [address] [facility] 36, [place], [address]     [address]   2102 [facility], [place], [address]    [address]  41 [facility], [address]  [address] [address]  [email or web address] [email or web address]  [email or web address]    Federico Baumann MSW, HERIBERTOW  (patients age 11-adult)   [address], 243 [facility], [address] [place] [address]   1 [facility] 209 [facility], [address], [address]   [facility], [address] [facility] 20, [facility], [facility],[address], [place], [place] [address]  [address] [facility] (specializing in addiction)  [facility], [facility]  [address] [facility] [address]  291 [facility], [place], 6100 [facility]   [facility], 403 [facility] , [facility], [place], 6100 [facility]   203 [facility], [address], [facility], [address] [place] [address]  100 [facility], [address], [place], [address]  [phone]     Rachael Huerta PsyD  1160 [facility], [address] [place], [place] [address]   [phone]    Laxmi Butcher, [address] [facility] [address] [address] [phone]      Hotlines:   Please program these numbers into your phone in case you or someone you know needs them  All services are free  65  People who call, text, or chat with 988 will be directly connected to the [address] [facility]  The existing Lifeline phone number ([phone]) will remain available  Callers can also connect with the [facility] or assistance in [address] [place] [address]   988 can be used by anyone, any time, at no cost  Trained crisis response professionals can support individuals considering suicide, self-harm, or any behavioral, substance abuse or misuse or mental health need for themselves or people looking for help for a loved one experiencing a mental health crisis  Lifeline services are available 24 hours a day, seven days a week at no cost to the caller  Crisis Text Line: text 461207     You are connected to a Crisis Counselor to bring a hot moment to a cool calm through active listening and collaborative problem solving  WarmLine:  [phone] or [phone]   They provide a supportive listening ear if you need it  They also can also provide information about mental health concerns and services  Crisis Line:  [facility] [phone],  [facility] [phone], [address] [place] [address], [facility] 65 and [place]: [phone]   24/7 crisis counseling, on-site counseling and walk-in counseling services available  National Suicide Prevention Lifeline:  1-698.799.1908  En 1200 [facility] [phone]   This is free, confidential, and available 24/7  [place]: [phone]   For those facing or having survived abuse 24 hour confidential help line, emergency safe house, counseling, advocacy and education  If you or someone your know are feeling as though you are going to hurt yourself, do not wait - GET HELP RIGHT AWAY  Go to the closest Emergency Room, call 911, or call someone you trust, family member or friend to be with you until you can get some help  Wellness Visit for Adults   AMBULATORY CARE:   A wellness visit  is when you see your healthcare provider to get screened for health problems  Your healthcare provider will also give you advice on how to stay healthy  Write down your questions so you remember to ask them  Ask your healthcare provider how often you should have a wellness visit  What happens at a wellness visit:  Your healthcare provider will ask about your health, and your family history of health problems  This includes high blood pressure, heart disease, and cancer  He or she will ask if you have symptoms that concern you, if you smoke, and about your mood   You may also be asked about your intake of medicines, supplements, food, and alcohol  Any of the following may be done: Your weight  will be checked  Your height may also be checked so your body mass index (BMI) can be calculated  Your BMI shows if you are at a healthy weight  Your blood pressure  and heart rate will be checked  Your temperature may also be checked  Blood and urine tests  may be done  Blood tests may be done to check your cholesterol levels  Abnormal cholesterol levels increase your risk for heart disease and stroke  You may also need a blood or urine test to check for diabetes if you are at increased risk  Urine tests may be done to look for signs of an infection or kidney disease  A physical exam  includes checking your heartbeat and lungs with a stethoscope  Your healthcare provider may also check your skin to look for sun damage  Screening tests  may be recommended  A screening test is done to check for diseases that may not cause symptoms  The screening tests you may need depend on your age, gender, family history, and lifestyle habits  For example, colorectal screening may be recommended if you are 48years old or older  Screening tests you need if you are a woman:   A Pap smear  is used to screen for cervical cancer  Pap smears are usually done every 3 to 5 years depending on your age  You may need them more often if you have had abnormal Pap smear test results in the past  Ask your healthcare provider how often you should have a Pap smear  A mammogram  is an x-ray of your breasts to screen for breast cancer  Experts recommend mammograms every 2 years starting at age 48 years  You may need a mammogram at age 52 years or younger if you have an increased risk for breast cancer  Talk to your healthcare provider about when you should start having mammograms and how often you need them  Vaccines you may need:   Get an influenza vaccine  every year   The influenza vaccine protects you from the flu  Several types of viruses cause the flu  The viruses change over time, so new vaccines are made each year  Get a tetanus-diphtheria (Td) booster vaccine  every 10 years  This vaccine protects you against tetanus and diphtheria  Tetanus is a severe infection that may cause painful muscle spasms and lockjaw  Diphtheria is a severe bacterial infection that causes a thick covering in the back of your mouth and throat  Get a human papillomavirus (HPV) vaccine  if you are female and aged 23 to 32 or male 23 to 24 and never received it  This vaccine protects you from HPV infection  HPV is the most common infection spread by sexual contact  HPV may also cause vaginal, penile, and anal cancers  Get a pneumococcal vaccine  if you are aged 72 years or older  The pneumococcal vaccine is an injection given to protect you from pneumococcal disease  Pneumococcal disease is an infection caused by pneumococcal bacteria  The infection may cause pneumonia, meningitis, or an ear infection  Get a shingles vaccine  if you are 60 or older, even if you have had shingles before  The shingles vaccine is an injection to protect you from the varicella-zoster virus  This is the same virus that causes chickenpox  Shingles is a painful rash that develops in people who had chickenpox or have been exposed to the virus  How to eat healthy:  My Plate is a model for planning healthy meals  It shows the types and amounts of foods that should go on your plate  Fruits and vegetables make up about half of your plate, and grains and protein make up the other half  A serving of dairy is included on the side of your plate  The amount of calories and serving sizes you need depends on your age, gender, weight, and height  Examples of healthy foods are listed below:  Eat a variety of vegetables  such as dark green, red, and orange vegetables   You can also include canned vegetables low in sodium (salt) and frozen vegetables without added butter or sauces  Eat a variety of fresh fruits , canned fruit in 100% juice, frozen fruit, and dried fruit  Include whole grains  At least half of the grains you eat should be whole grains  Examples include whole-wheat bread, wheat pasta, brown rice, and whole-grain cereals such as oatmeal     Eat a variety of protein foods such as seafood (fish and shellfish), lean meat, and poultry without skin (turkey and chicken)  Examples of lean meats include pork leg, shoulder, or tenderloin, and beef round, sirloin, tenderloin, and extra lean ground beef  Other protein foods include eggs and egg substitutes, beans, peas, soy products, nuts, and seeds  Choose low-fat dairy products such as skim or 1% milk or low-fat yogurt, cheese, and cottage cheese  Limit unhealthy fats  such as butter, hard margarine, and shortening  Exercise:  Exercise at least 30 minutes per day on most days of the week  Some examples of exercise include walking, biking, dancing, and swimming  You can also fit in more physical activity by taking the stairs instead of the elevator or parking farther away from stores  Include muscle strengthening activities 2 days each week  Regular exercise provides many health benefits  It helps you manage your weight, and decreases your risk for type 2 diabetes, heart disease, stroke, and high blood pressure  Exercise can also help improve your mood  Ask your healthcare provider about the best exercise plan for you  General health and safety guidelines:   Do not smoke  Nicotine and other chemicals in cigarettes and cigars can cause lung damage  Ask your healthcare provider for information if you currently smoke and need help to quit  E-cigarettes or smokeless tobacco still contain nicotine  Talk to your healthcare provider before you use these products  Limit alcohol  A drink of alcohol is 12 ounces of beer, 5 ounces of wine, or 1½ ounces of liquor  Lose weight, if needed    Being overweight increases your risk of certain health conditions  These include heart disease, high blood pressure, type 2 diabetes, and certain types of cancer  Protect your skin  Do not sunbathe or use tanning beds  Use sunscreen with a SPF 15 or higher  Apply sunscreen at least 15 minutes before you go outside  Reapply sunscreen every 2 hours  Wear protective clothing, hats, and sunglasses when you are outside  Drive safely  Always wear your seatbelt  Make sure everyone in your car wears a seatbelt  A seatbelt can save your life if you are in an accident  Do not use your cell phone when you are driving  This could distract you and cause an accident  Pull over if you need to make a call or send a text message  Practice safe sex  Use latex condoms if are sexually active and have more than one partner  Your healthcare provider may recommend screening tests for sexually transmitted infections (STIs)  Wear helmets, lifejackets, and protective gear  Always wear a helmet when you ride a bike or motorcycle, go skiing, or play sports that could cause a head injury  Wear protective equipment when you play sports  Wear a lifejacket when you are on a boat or doing water sports  © Copyright [employer] 2022 Information is for End User's use only and may not be sold, redistributed or otherwise used for commercial purposes  All illustrations and images included in CareNotes® are the copyrighted property of A D A M , Inc  or [facility] Cuong Celis   The above information is an  only  It is not intended as medical advice for individual conditions or treatments  Talk to your doctor, nurse or pharmacist before following any medical regimen to see if it is safe and effective for you  Obesity   AMBULATORY CARE:   Obesity  means your body mass index (BMI) is greater than 30  Your healthcare provider will use your height and weight to measure your BMI    The risks of obesity include  many health problems, including injuries or physical disability  Diabetes (high blood sugar level)    High blood pressure or high cholesterol    Heart disease    Stroke    Gallbladder or liver disease    Cancer of the colon, breast, prostate, liver, or kidney    Sleep apnea    Arthritis or gout    Screening  is done to check for health conditions before you have signs or symptoms  If you are 28to 79years old, your blood sugar level may be checked every 3 years for signs of prediabetes or diabetes  Your healthcare provider will check your blood pressure at each visit  High blood pressure can lead to a stroke or other problems  Your provider may check for signs of heart disease, cancer, or other health problems  Seek care immediately if:   You have a severe headache, confusion, or difficulty speaking  You have weakness on one side of your body  You have chest pain, sweating, or shortness of breath  Call your doctor if:   You have symptoms of gallbladder or liver disease, such as pain in your upper abdomen  You have knee or hip pain and discomfort while walking  You have symptoms of diabetes, such as intense hunger and thirst, and frequent urination  You have symptoms of sleep apnea, such as snoring or daytime sleepiness  You have questions or concerns about your condition or care  Treatment for obesity  focuses on helping you lose weight to improve your health  Even a small decrease in BMI can reduce the risk for many health problems  Your healthcare provider will help you set a weight-loss goal   Lifestyle changes  are the first step in treating obesity  These include making healthy food choices and getting regular physical activity  Your healthcare provider may suggest a weight-loss program that involves coaching, education, and therapy  Medicine  may help you lose weight when it is used with a healthy foods and physical activity      Surgery  can help you lose weight if you are very obese and have other health problems  There are several types of weight-loss surgery  Ask your healthcare provider for more information  Tips for safe weight loss:   Set small, realistic goals  An example of a small goal is to walk for 20 minutes 5 days a week  Kristine goal is to lose 5% of your body weight  Tell friends, family members, and coworkers about your goals  and ask for their support  Ask a friend to lose weight with you, or join a weight-loss support group  Identify foods or triggers that may cause you to overeat , and find ways to avoid them  Remove tempting high-calorie foods from your home and workplace  Place a bowl of fresh fruit on your kitchen counter  If stress causes you to eat, then find other ways to cope with stress  A counselor or therapist may be able to help you  Keep a diary to track what you eat and drink  Also write down how many minutes of physical activity you do each day  Weigh yourself once a week and record it in your diary  Eating changes: You will need to eat 500 to 1,000 fewer calories each day than you currently eat to lose 1 to 2 pounds a week  The following changes will help you cut calories:  Eat smaller portions  Use small plates, no larger than 9 inches in diameter  Fill your plate half full of fruits and vegetables  Measure your food using measuring cups until you know what a serving size looks like  Eat 3 meals and 1 or 2 snacks each day  Plan your meals in advance  Negrito Jones and eat at home most of the time  Eat slowly  Do not skip meals  Skipping meals can lead to overeating later in the day  This can make it harder for you to lose weight  Talk with a dietitian to help you make a meal plan and schedule that is right for you  Eat fruits and vegetables at every meal   They are low in calories and high in fiber, which makes you feel full  Do not add butter, margarine, or cream sauce to vegetables  Use herbs to season steamed vegetables      Eat less fat and fewer fried foods  Eat more baked or grilled chicken and fish  These protein sources are lower in calories and fat than red meat  Limit fast food  Dress your salads with olive oil and vinegar instead of bottled dressing  Limit the amount of sugar you eat  Do not drink sugary beverages  Limit alcohol  Activity changes:  Physical activity is good for your body in many ways  It helps you burn calories and build strong muscles  It decreases stress and depression, and improves your mood  It can also help you sleep better  Talk to your healthcare provider before you begin an exercise program   Exercise for at least 30 minutes 5 days a week  Start slowly  Set aside time each day for physical activity that you enjoy and that is convenient for you  It is best to do both weight training and an activity that increases your heart rate, such as walking, bicycling, or swimming  Find ways to be more active  Do yard work and housecleaning  Walk up the stairs instead of using elevators  Spend your leisure time going to events that require walking, such as outdoor festivals or fairs  This extra physical activity can help you lose weight and keep it off  Follow up with your doctor as directed: You may need to meet with a dietitian  Write down your questions so you remember to ask them during your visits  © Copyright [employer] 2022 Information is for End User's use only and may not be sold, redistributed or otherwise used for commercial purposes  All illustrations and images included in CareNotes® are the copyrighted property of [employer]  or Ashish Celis   The above information is an  only  It is not intended as medical advice for individual conditions or treatments  Talk to your doctor, nurse or pharmacist before following any medical regimen to see if it is safe and effective for you      Cigarette Smoking and Your Health   AMBULATORY CARE:   Risks to your health if you smoke: Nicotine and other chemicals found in tobacco and e-cigarettes can damage every cell in your body  Even if you are a light smoker, you have an increased risk for cancer, heart disease, and lung disease  If you are pregnant or have diabetes, smoking increases your risk for complications  Nicotine can affect an adolescent's developing brain  This can lead to trouble thinking, learning, or paying attention  Benefits to your health if you stop smoking: You decrease respiratory symptoms such as coughing, wheezing, and shortness of breath  You reduce your risk for cancers of the lung, mouth, throat, kidney, bladder, pancreas, stomach, and cervix  If you already have cancer, you increase the benefits of chemotherapy  You also reduce your risk for cancer returning or a second cancer from developing  You reduce your risk for heart disease, blood clots, heart attack, and stroke  You reduce your risk for lung infections, and diseases such as pneumonia, asthma, chronic bronchitis, and emphysema  Your circulation improves  More oxygen can be delivered to your body  If you have diabetes, you lower your risk for complications, such as kidney, artery, and eye diseases  You also lower your risk for nerve damage  Nerve damage can lead to amputations, poor vision, and blindness  You improve your body's ability to heal and to fight infections  An adolescent can help his or her brain and body develop in a healthy way  Talk to your adolescent about all the health risks of nicotine  If you can, start talking about nicotine when your child is younger than 12 years  This may make it easier for him or her not to start using nicotine as a teenager or adult  Explain to him or her that it is best never to start  It can be hard to try to quit later  Benefits to the health of others if you stop smoking:  Tobacco is harmful to nonsmokers who breathe in your secondhand smoke   The following are ways the health of others around you may improve when you stop smoking: You lower the risks for lung cancer and heart disease in nonsmoking adults  If you are pregnant, you lower the risk for miscarriage, early delivery, low birth weight, and stillbirth  You also lower your baby's risk for SIDS, obesity, developmental delay, and neurobehavioral problems, such as ADHD  If you have children, you lower their risk for ear infections, colds, pneumonia, bronchitis, and asthma  Follow up with your doctor as directed:  Write down your questions so you remember to ask them during your visits  For support and more information:   American [employer] Association  [address] [facility],[address]  [address]  Phone: [facility] [address]  Phone: [phone]  Web Address: [employer]l    Smokefree  gov  Phone: 8- 284 - 060-7809  Web Address: [employer] smokefree    [facility] 2022 Information is for End User's use only and may not be sold, redistributed or otherwise used for commercial purposes  All illustrations and images included in CareNotes® are the copyrighted property of [employer]  or [address] [place] [address]  The above information is an  only  It is not intended as medical advice for individual conditions or treatments  Talk to your doctor, nurse or pharmacist before following any medical regimen to see if it is safe and effective for you  Cholesterol and Your Health   AMBULATORY CARE:   Cholesterol  is a waxy, fat-like substance  Your body uses cholesterol to make hormones and new cells, and to protect nerves  Cholesterol is made by your body  It also comes from certain foods you eat, such as meat and dairy products  Your healthcare provider can help you set goals for your cholesterol levels  He or she can help you create a plan to meet your goals  Cholesterol level goals: Your cholesterol level goals depend on your risk for heart disease, your age, and your other health conditions   The following are general guidelines: Total cholesterol  includes low-density lipoprotein (LDL), high-density lipoprotein (HDL), and triglyceride levels  The total cholesterol level should be lower than 200 mg/dL and is best at about 150 mg/dL  LDL cholesterol  is called bad cholesterol  because it forms plaque in your arteries  As plaque builds up, your arteries become narrow, and less blood flows through  When plaque decreases blood flow to your heart, you may have chest pain  If plaque completely blocks an artery that brings blood to your heart, you may have a heart attack  Plaque can break off and form blood clots  Blood clots may block arteries in your brain and cause a stroke  The level should be less than 130 mg/dL and is best at about 100 mg/dL  HDL cholesterol  is called good cholesterol  because it helps remove LDL cholesterol from your arteries  It does this by attaching to LDL cholesterol and carrying it to your liver  Your liver breaks down LDL cholesterol so your body can get rid of it  High levels of HDL cholesterol can help prevent a heart attack and stroke  Low levels of HDL cholesterol can increase your risk for heart disease, heart attack, and stroke  The level should be 60 mg/dL or higher  Triglycerides  are a type of fat that store energy from foods you eat  High levels of triglycerides also cause plaque buildup  This can increase your risk for a heart attack or stroke  If your triglyceride level is high, your LDL cholesterol level may also be high  The level should be less than 150 mg/dL      Any of the following can increase your risk for high cholesterol:   Smoking cigarettes    Being overweight or obese, or not getting enough exercise    Drinking large amounts of alcohol    A medical condition such as hypertension (high blood pressure) or diabetes    Certain genes passed from your parents to you    Age older than 65 years    What you need to know about having your cholesterol levels checked: Adults 21to 39years of age should have their cholesterol levels checked every 4 to 6 years  Adults 45 years or older should have their cholesterol checked every 1 to 2 years  You may need your cholesterol checked more often, or at a younger age, if you have risk factors for heart disease  You may also need to have your cholesterol checked more often if you have other health conditions, such as diabetes  Blood tests are used to check cholesterol levels  Blood tests measure your levels of triglycerides, LDL cholesterol, and HDL cholesterol  How healthy fats affect your cholesterol levels:  Healthy fats, also called unsaturated fats, help lower LDL cholesterol and triglyceride levels  Healthy fats include the following:  Monounsaturated fats  are found in foods such as olive oil, canola oil, avocado, nuts, and olives  Polyunsaturated fats,  such as omega 3 fats, are found in fish, such as salmon, trout, and tuna  They can also be found in plant foods such as flaxseed, walnuts, and soybeans  How unhealthy fats affect your cholesterol levels:  Unhealthy fats increase LDL cholesterol and triglyceride levels  They are found in foods high in cholesterol, saturated fat, and trans fat:  Cholesterol  is found in eggs, dairy, and meat  Saturated fat  is found in butter, cheese, ice cream, whole milk, and coconut oil  Saturated fat is also found in meat, such as sausage, hot dogs, and bologna  Trans fat  is found in liquid oils and is used in fried and baked foods  Foods that contain trans fats include chips, crackers, muffins, sweet rolls, microwave popcorn, and cookies  Treatment  for high cholesterol will also decrease your risk of heart disease, heart attack, and stroke  Treatment may include any of the following:  Lifestyle changes  may include food, exercise, weight loss, and quitting smoking  You may also need to decrease the amount of alcohol you drink   Your healthcare provider will want you to start with lifestyle changes  Other treatment may be added if lifestyle changes are not enough  Your healthcare provider may recommend you work with a team to manage hyperlipidemia  The team may include medical experts such as a dietitian, an exercise or physical therapist, and a behavior therapist  Your family members may be included in helping you create lifestyle changes  Medicines  may be given to lower your LDL cholesterol, triglyceride levels, or total cholesterol level  You may need medicines to lower your cholesterol if any of the following is true:    You have a history of stroke, TIA, unstable angina, or a heart attack  Your LDL cholesterol level is 190 mg/dL or higher  You are age 36 to 76 years, have diabetes or heart disease risk factors, and your LDL cholesterol is 70 mg/dL or higher  Supplements  include fish oil, red yeast rice, and garlic  Fish oil may help lower your triglyceride and LDL cholesterol levels  It may also increase your HDL cholesterol level  Red yeast rice may help decrease your total cholesterol level and LDL cholesterol level  Garlic may help lower your total cholesterol level  Do not take any supplements without talking to your healthcare provider  Food changes you can make to lower your cholesterol levels:  A dietitian can help you create a healthy eating plan  He or she can show you how to read food labels and choose foods low in saturated fat, trans fats, and cholesterol  Decrease the total amount of fat you eat  Choose lean meats, fat-free or 1% fat milk, and low-fat dairy products, such as yogurt and cheese  Try to limit or avoid red meats  Limit or do not eat fried foods or baked goods, such as cookies  Replace unhealthy fats with healthy fats  Cook foods in olive oil or canola oil  Choose soft margarines that are low in saturated fat and trans fat  Seeds, nuts, and avocados are other examples of healthy fats  Eat foods with omega-3 fats    Examples include salmon, tuna, mackerel, walnuts, and flaxseed  Eat fish 2 times per week  Pregnant women should not eat fish that have high levels of mercury, such as shark, swordfish, and lacey mackerel  Increase the amount of high-fiber foods you eat  High-fiber foods can help lower your LDL cholesterol  Aim to get between 20 and 30 grams of fiber each day  Fruits and vegetables are high in fiber  Eat at least 5 servings each day  Other high-fiber foods are whole-grain or whole-wheat breads, pastas, or cereals, and brown rice  Eat 3 ounces of whole-grain foods each day  Increase fiber slowly  You may have abdominal discomfort, bloating, and gas if you add fiber to your diet too quickly  Eat healthy protein foods  Examples include low-fat dairy products, skinless chicken and turkey, fish, and nuts  Limit foods and drinks that are high in sugar  Your dietitian or healthcare provider can help you create daily limits for high-sugar foods and drinks  The limit may be lower if you have diabetes or another health condition  Limits can also help you lose weight if needed  Lifestyle changes you can make to lower your cholesterol levels:   Maintain a healthy weight  Ask your healthcare provider what a healthy weight is for you  Ask him or her to help you create a weight loss plan if needed  Weight loss can decrease your total cholesterol and triglyceride levels  Weight loss may also help keep your blood pressure at a healthy level  Be physically active throughout the day  Physical activity, such as exercise, can help lower your total cholesterol level and maintain a healthy weight  Physical activity can also help increase your HDL cholesterol level  Work with your healthcare provider to create an program that is right for you  Get at least 30 to 40 minutes of moderate physical activity most days of the week  Examples of exercise include brisk walking, swimming, or biking   Also include strength training at least 2 times each week  Your healthcare providers can help you create a physical activity plan  Do not smoke  Nicotine and other chemicals in cigarettes and cigars can raise your cholesterol levels  Ask your healthcare provider for information if you currently smoke and need help to quit  E-cigarettes or smokeless tobacco still contain nicotine  Talk to your healthcare provider before you use these products  Limit or do not drink alcohol  Alcohol can increase your triglyceride levels  Ask your healthcare provider before you drink alcohol  Ask how much is okay for you to drink in 24 hours or 1 week  Follow up with your doctor as directed:  Write down your questions so you remember to ask them during your visits  © Copyright [employer] 2022 Information is for End User's use only and may not be sold, redistributed or otherwise used for commercial purposes  All illustrations and images included in CareNotes® are the copyrighted property of [employer]  or Ashish Celis   The above information is an  only  It is not intended as medical advice for individual conditions or treatments  Talk to your doctor, nurse or pharmacist before following any medical regimen to see if it is safe and effective for you

## 2022-10-13 NOTE — TELEPHONE ENCOUNTER
Upon review of the In Basket request we were able to locate, review, and update the patient chart as requested for Immunization(s) Zoster  Any additional questions or concerns should be emailed to the Practice Liaisons via the appropriate education email address, please do not reply via In Basket      Thank you  Melinda Gibbs

## 2022-10-14 LAB
6MAM UR QL CFM: NEGATIVE NG/ML
7AMINOCLONAZEPAM UR QL CFM: NORMAL NG/ML
A-OH ALPRAZ UR QL CFM: NEGATIVE NG/ML
ACCEPTABLE CREAT UR QL: NORMAL MG/DL
ACCEPTIBLE SP GR UR QL: NORMAL
AMPHET UR QL CFM: NEGATIVE NG/ML
BUPRENORPHINE UR QL CFM: NEGATIVE NG/ML
BUTALBITAL UR QL CFM: NEGATIVE NG/ML
BZE UR QL CFM: NEGATIVE NG/ML
CODEINE UR QL CFM: NEGATIVE NG/ML
EDDP UR QL CFM: NEGATIVE NG/ML
ETHYL GLUCURONIDE UR QL CFM: NEGATIVE NG/ML
ETHYL SULFATE UR QL SCN: NEGATIVE NG/ML
EUTYLONE UR QL: NEGATIVE NG/ML
FENTANYL UR QL CFM: NEGATIVE NG/ML
GLIADIN IGG SER IA-ACNC: NEGATIVE NG/ML
HYDROCODONE UR QL CFM: NEGATIVE NG/ML
HYDROMORPHONE UR QL CFM: NEGATIVE NG/ML
LORAZEPAM UR QL CFM: NEGATIVE NG/ML
ME-PHENIDATE UR QL CFM: NEGATIVE NG/ML
MEPERIDINE UR QL CFM: NEGATIVE NG/ML
METHADONE UR QL CFM: NEGATIVE NG/ML
METHAMPHET UR QL CFM: NEGATIVE NG/ML
MORPHINE UR QL CFM: NEGATIVE NG/ML
NALTREXONE UR QL CFM: NEGATIVE NG/ML
NITRITE UR QL: NORMAL UG/ML
NORBUPRENORPHINE UR QL CFM: NEGATIVE NG/ML
NORDIAZEPAM UR QL CFM: NEGATIVE NG/ML
NORFENTANYL UR QL CFM: NEGATIVE NG/ML
NORHYDROCODONE UR QL CFM: NEGATIVE NG/ML
NORMEPERIDINE UR QL CFM: NEGATIVE NG/ML
NOROXYCODONE UR QL CFM: NEGATIVE NG/ML
OXAZEPAM UR QL CFM: NEGATIVE NG/ML
OXYCODONE UR QL CFM: NEGATIVE NG/ML
OXYMORPHONE UR QL CFM: NEGATIVE NG/ML
OXYMORPHONE UR QL CFM: NEGATIVE NG/ML
PARA-FLUOROFENTANYL QUANTIFICATION: NORMAL NG/ML
PHENOBARB UR QL CFM: NEGATIVE NG/ML
RESULT ALL_PRESCRIBED MEDS AND SPECIAL INSTRUCTIONS: NORMAL
SECOBARBITAL UR QL CFM: NEGATIVE NG/ML
SL AMB 4-ANPP QUANTIFICATION: NORMAL NG/ML
SL AMB 5F-ADB-M7 METABOLITE QUANTIFICATION: NEGATIVE NG/ML
SL AMB 7-OH-MITRAGYNINE (KRATOM ALKALOID) QUANTIFICATION: NEGATIVE NG/ML
SL AMB AB-FUBINACA-M3 METABOLITE QUANTIFICATION: NEGATIVE NG/ML
SL AMB ACETYL FENTANYL QUANTIFICATION: NORMAL NG/ML
SL AMB ACETYL NORFENTANYL QUANTIFICATION: NORMAL NG/ML
SL AMB ACRYL FENTANYL QUANTIFICATION: NORMAL NG/ML
SL AMB CARFENTANIL QUANTIFICATION: NORMAL NG/ML
SL AMB CTHC (MARIJUANA METABOLITE) QUANTIFICATION: NEGATIVE NG/ML
SL AMB DEXTRORPHAN (DEXTROMETHORPHAN METABOLITE) QUANT: NEGATIVE NG/ML
SL AMB GABAPENTIN QUANTIFICATION: NEGATIVE
SL AMB JWH018 METABOLITE QUANTIFICATION: NEGATIVE NG/ML
SL AMB JWH073 METABOLITE QUANTIFICATION: NEGATIVE NG/ML
SL AMB MDMB-FUBINACA-M1 METABOLITE QUANTIFICATION: NEGATIVE NG/ML
SL AMB METHYLONE QUANTIFICATION: NEGATIVE NG/ML
SL AMB N-DESMETHYL-TRAMADOL QUANTIFICATION: NEGATIVE NG/ML
SL AMB PHENTERMINE QUANTIFICATION: NEGATIVE NG/ML
SL AMB PREGABALIN QUANTIFICATION: NEGATIVE
SL AMB RCS4 METABOLITE QUANTIFICATION: NEGATIVE NG/ML
SL AMB RITALINIC ACID QUANTIFICATION: NEGATIVE NG/ML
SMOOTH MUSCLE AB TITR SER IF: NEGATIVE NG/ML
SPECIMEN DRAWN SERPL: NEGATIVE NG/ML
SPECIMEN PH ACCEPTABLE UR: NORMAL
TAPENTADOL UR QL CFM: NEGATIVE NG/ML
TEMAZEPAM UR QL CFM: NEGATIVE NG/ML
TEMAZEPAM UR QL CFM: NEGATIVE NG/ML
TRAMADOL UR QL CFM: NEGATIVE NG/ML
URATE/CREAT 24H UR: NEGATIVE NG/ML

## 2022-11-25 ENCOUNTER — OFFICE VISIT (OUTPATIENT)
Dept: FAMILY MEDICINE CLINIC | Facility: CLINIC | Age: 62
End: 2022-11-25

## 2022-11-25 VITALS
WEIGHT: 223.8 LBS | SYSTOLIC BLOOD PRESSURE: 128 MMHG | BODY MASS INDEX: 33.15 KG/M2 | HEART RATE: 92 BPM | TEMPERATURE: 97.4 F | DIASTOLIC BLOOD PRESSURE: 70 MMHG | HEIGHT: 69 IN | RESPIRATION RATE: 16 BRPM | OXYGEN SATURATION: 97 %

## 2022-11-25 DIAGNOSIS — Z72.0 CHEWING TOBACCO USE: ICD-10-CM

## 2022-11-25 DIAGNOSIS — G47.00 INSOMNIA, UNSPECIFIED TYPE: Primary | ICD-10-CM

## 2022-11-25 DIAGNOSIS — F33.42 RECURRENT MAJOR DEPRESSIVE DISORDER, IN FULL REMISSION (HCC): ICD-10-CM

## 2022-11-25 DIAGNOSIS — E66.9 CLASS 1 OBESITY WITH SERIOUS COMORBIDITY AND BODY MASS INDEX (BMI) OF 33.0 TO 33.9 IN ADULT, UNSPECIFIED OBESITY TYPE: ICD-10-CM

## 2022-11-25 DIAGNOSIS — D48.5 NEOPLASM OF UNCERTAIN BEHAVIOR OF SKIN OF FACE: ICD-10-CM

## 2022-11-25 DIAGNOSIS — F41.9 ANXIETY: ICD-10-CM

## 2022-11-25 DIAGNOSIS — Z23 ENCOUNTER FOR IMMUNIZATION: ICD-10-CM

## 2022-11-25 DIAGNOSIS — G47.33 SLEEP APNEA, OBSTRUCTIVE: ICD-10-CM

## 2022-11-25 PROBLEM — F17.290 CIGAR SMOKER: Status: RESOLVED | Noted: 2022-10-12 | Resolved: 2022-11-25

## 2022-11-25 RX ORDER — CLONAZEPAM 0.5 MG/1
TABLET ORAL
Qty: 60 TABLET | Refills: 2
Start: 2022-11-25

## 2022-11-25 RX ORDER — DULOXETIN HYDROCHLORIDE 60 MG/1
60 CAPSULE, DELAYED RELEASE ORAL DAILY
Qty: 30 CAPSULE | Refills: 8 | Status: SHIPPED | OUTPATIENT
Start: 2022-11-25

## 2022-11-25 RX ORDER — BUSPIRONE HYDROCHLORIDE 7.5 MG/1
TABLET ORAL
Qty: 90 TABLET | Refills: 1 | Status: SHIPPED | OUTPATIENT
Start: 2022-11-25

## 2022-11-25 RX ORDER — TRAZODONE HYDROCHLORIDE 50 MG/1
TABLET ORAL
Qty: 30 TABLET | Refills: 1 | Status: SHIPPED | OUTPATIENT
Start: 2022-11-25

## 2022-11-25 NOTE — ASSESSMENT & PLAN NOTE
Uncontrolled  Start Trazodone 50mg QHS PRN  Uncontrolled TIMA may be contributing  Continue Cymbalta 60 mg daily  Patient declines dose increase to Cymbalta 90 mg daily  Smart phone sebas list and counseling list given previously  Long-term side effects of benzodiazepines discussed previously  Continue Weaning Klonopin 0 5m/2 pill by mouth in AM, then 1/s pill by mouth at bedtime x  2 more week for a total of 1 month  Patient will call with update in 2 weeks  If insomnia is improved, will plan to then decrease to 1/2 pill at bedtime x 2 weeks, then 1/2 pill at bedtime every other night x 2 weeks, then stop  Patient states he currently has 70 pills of Klonopin prescription available  CSA signed 10/12/22, GILBERT done 10/12/22

## 2022-11-25 NOTE — ASSESSMENT & PLAN NOTE
Stable  Continue Cymbalta 60 mg daily and Buspar 7 5mg TID PRN  Patient declines dose increase to Cymbalta 90 mg daily  Smart phone sebas list and counseling list given previously  Long-term side effects of benzodiazepines discussed previously  Continue Weaning Klonopin 0 5m/2 pill by mouth in AM, then 1/s pill by mouth at bedtime x  2 more week for a total of 1 month  Patient will call with update in 2 weeks  If insomnia is improved, will plan to then decrease to 1/2 pill at bedtime x 2 weeks, then 1/2 pill at bedtime every other night x 2 weeks, then stop  Patient states he currently has 70 pills of Klonopin prescription available  CSA signed 10/12/22, RUDS done 10/12/22

## 2022-11-25 NOTE — ASSESSMENT & PLAN NOTE
Management per Sleep Medicine  Continue CPAP  Uncontrolled TIMA may be contributing to insomnia  Long-term side effects of benzodiazepines discussed previously  Continue Weaning Klonopin 0 5m/2 pill by mouth in AM, then 1/s pill by mouth at bedtime x  2 more week for a total of 1 month  Patient will call with update in 2 weeks  If insomnia is improved, will plan to then decrease to 1/2 pill at bedtime x 2 weeks, then 1/2 pill at bedtime every other night x 2 weeks, then stop  Patient states he currently has 70 pills of Klonopin prescription available  CSA signed 10/12/22, GILBERT done 10/12/22

## 2022-11-25 NOTE — PROGRESS NOTES
Assessment/Plan:  Problem List Items Addressed This Visit        Respiratory    Sleep apnea, obstructive     Management per Sleep Medicine  Continue CPAP  Uncontrolled TIMA may be contributing to insomnia  Long-term side effects of benzodiazepines discussed previously  Continue Weaning Klonopin 0 5m/2 pill by mouth in AM, then 1/s pill by mouth at bedtime x  2 more week for a total of 1 month  Patient will call with update in 2 weeks  If insomnia is improved, will plan to then decrease to 1/2 pill at bedtime x 2 weeks, then 1/2 pill at bedtime every other night x 2 weeks, then stop  Patient states he currently has 70 pills of Klonopin prescription available  CSA signed 10/12/22, RUDS done 10/12/22  Other    Depression     Stable  Continue Cymbalta 60 mg daily  Patient declines dose increase to Cymbalta 90 mg daily  Smart phone sebas list and counseling list given previously  Relevant Medications    traZODone (DESYREL) 50 mg tablet    busPIRone (BUSPAR) 7 5 mg tablet    DULoxetine (CYMBALTA) 60 mg delayed release capsule    Anxiety     Stable  Continue Cymbalta 60 mg daily and Buspar 7 5mg TID PRN  Patient declines dose increase to Cymbalta 90 mg daily  Smart phone sebas list and counseling list given previously  Long-term side effects of benzodiazepines discussed previously  Continue Weaning Klonopin 0 5m/2 pill by mouth in AM, then 1/s pill by mouth at bedtime x  2 more week for a total of 1 month  Patient will call with update in 2 weeks  If insomnia is improved, will plan to then decrease to 1/2 pill at bedtime x 2 weeks, then 1/2 pill at bedtime every other night x 2 weeks, then stop  Patient states he currently has 70 pills of Klonopin prescription available  CSA signed 10/12/22, RUDS done 10/12/22  Relevant Medications    busPIRone (BUSPAR) 7 5 mg tablet    clonazePAM (KlonoPIN) 0 5 mg tablet    Obesity     Stable    Recommend lifestyle modifications  Chewing tobacco use     Contemplative  Chewing tobacco cessation advised  Insomnia - Primary     Uncontrolled  Start Trazodone 50mg QHS PRN  Uncontrolled TIMA may be contributing  Continue Cymbalta 60 mg daily  Patient declines dose increase to Cymbalta 90 mg daily  Smart phone sebas list and counseling list given previously  Long-term side effects of benzodiazepines discussed previously  Continue Weaning Klonopin 0 5m/2 pill by mouth in AM, then 1/s pill by mouth at bedtime x  2 more week for a total of 1 month  Patient will call with update in 2 weeks  If insomnia is improved, will plan to then decrease to 1/2 pill at bedtime x 2 weeks, then 1/2 pill at bedtime every other night x 2 weeks, then stop  Patient states he currently has 70 pills of Klonopin prescription available  CSA signed 10/12/22, RUDS done 10/12/22  Relevant Medications    traZODone (DESYREL) 50 mg tablet   Other Visit Diagnoses     Neoplasm of uncertain behavior of skin of face        Relevant Orders    Ambulatory Referral to Dermatology    Ambulatory Referral to Plastic Surgery    Encounter for immunization        Relevant Orders    HEPATITIS B VACCINE ADULT IM    HEPATITIS B VACCINE ADULT IM    HEPATITIS B VACCINE ADULT IM           Return in 6 weeks (on 2023) for 40min - F/U HL, Anx/Dep, Labs; PRN Hep B#1, 1mo - HepB#2, 6mo- HepB#3  Future Appointments   Date Time Provider Bradford Finn   2023  9:00 AM Chandan Ovalle DO [facility]        Subjective:     Randee Biswas is a 58 y o  male who presents today for a follow-up on his chronic medical conditions  HPI:  Chief Complaint   Patient presents with   • Follow-up     6 wk f/u for labs, HL, anx/dep  No new problems or concerns at this time  -- Above per clinical staff and reviewed   --      HPI      Today:    Return in about 6 weeks (around 2022) for 40min - F/U HL, Anx/Dep, Labs     Will be gone 22 - 23         [facility] Dr Owen Baer - Next due 23 and 23 for labs and physical   Has PSA done yearly by [place]  Declines LENARD  Patient did not complete Labs 10/12/22  Right Temple Itchy Lesion - Symptoms x 2 months  Using OTC Hydrocortisone cream c benefit         Obesity - Watching diet  No Exercise - Previously Rides Motorcross bike for 30 minutes, 2 times per week  Previously ran         Hyperlipidemia / Hypertriglyceridemia - On Fenofibrate 48mg QD [facility] since   No other cholesterol Rx previously          GERD - Stable on Pepcid OTC PRN  Watching GERD diet  No EGD previously           TIMA - Management per [place] [facility]  Next appt   Using CPAP nightly          Anxiety / Depression - Stable  On Buspar 7 5mg TID PRN x 6 weeks - He has not been taking a 3rd dose for 1 week, sometimes taking 1/2 of 7 5mg pill for no particular reason, able to take full 3rd dose s issue  He was taking 3rd dose to experiment, but not due to anxiety  He states he his sleep has worsened when he reduced his bedtime dose (which he is taking 4-5pm because he was having anxiety then, but no longer with Buspar instead of QHS) of Klonopin form 0 5mg 1 pill to 1/2 pill at bedtime  He is able to fall asleep early, sleeps for 3-4 hours, then awakes at 1:00am, awake fro 1 5 hours, falls asleep, awakens, falls asleep, awakens, but sometimes does not feel well rested in AM   Sleeping for a total for 5 hours per night  Drinks 16oz daily  Weaning Klonopin 0 5mg BID - last Rx 22 by prior PCP - last dose 6:00am    Wean Klonopin 0 5m/2 pill by mouth in AM, then 1 pill by mouth at bedtime x 1 month, then 1/2 pill by mouth in AM and 1/2 pill at bedtime x 2 weeks  On Cymbalta 60mg QD  Good social supports  No SI/HI/AH/VH  No counseling  Previously on Ativan    No higher doses or other mood meds previously        PHQ-2/9 Depression Screening    Little interest or pleasure in doing things: 0 - not at all  Feeling down, depressed, or hopeless: 0 - not at all  Trouble falling or staying asleep, or sleeping too much: 3 - nearly every day  Feeling tired or having little energy: 1 - several days  Poor appetite or overeatin - not at all  Feeling bad about yourself - or that you are a failure or have let yourself or your family down: 0 - not at all  Trouble concentrating on things, such as reading the newspaper or watching television: 0 - not at all  Moving or speaking so slowly that other people could have noticed  Or the opposite - being so fidgety or restless that you have been moving around a lot more than usual: 0 - not at all  Thoughts that you would be better off dead, or of hurting yourself in some way: 0 - not at all  PHQ-9 Score: 4   PHQ-9 Interpretation: No or Minimal depression          NENA-7 Flowsheet Screening    Flowsheet Row Most Recent Value   Over the last 2 weeks, how often have you been bothered by any of the following problems? Feeling nervous, anxious, or on edge 1   Not being able to stop or control worrying 1   Worrying too much about different things 0   Trouble relaxing 1   Being so restless that it is hard to sit still 1   Becoming easily annoyed or irritable 0   Feeling afraid as if something awful might happen 0   NENA-7 Total Score 4             Vitamin D Deficiency - On Rx Vitamin D dose unknown per [place]  Not taking MVI        AR - Stable on Claritin PRN        B/L Tinnitus - Previously seen by ENT  F/U PRN  He never got hearing aids  He wears ear protection when hunting        Right Middle Finger OA - Can't flex finger    Declines Ortho Hand consult 10/12/22, but may reconsider in future        Chews Tobacco - Contemplative                  From previous note:    Controlled Substance Review     [place] PDMP or [place]  reviewed: No red flags were identified; safe to proceed with prescription          [facility] Dr Colt Pichardo - Next due   Has PSA done yearly [facility]         Obesity - Not watching diet  +Exercise - Rides Motorcross bike for 30 minutes, 2 times per week  Previously ran         Hyperlipidemia / Hypertriglyceridemia - On Fenofibrate 48mg QD [facility] since   No other cholesterol Rx previously          GERD - Stable on Pepcid OTC PRN  Watching GERD diet  No EGD previously           TIMA - Management per [place] [facility]  Next appt   Using CPAP nightly          Anxiety / Depression - Stable  On Klonopin 0 5mg BID - last Rx 22 by prior PCP - last dose 6:00am, On Cymbalta 60mg QD  Good social supports  No SI/HI/AH/VH  No counseling  Previously on Ativan  No higher doses or other mood meds previously           PHQ-2/9 Depression Screening       Little interest or pleasure in doing things: 0 - not at all  Feeling down, depressed, or hopeless: 1 - several days  Trouble falling or staying asleep, or sleeping too much: 0 - not at all  Feeling tired or having little energy: 1 - several days  Poor appetite or overeatin - not at all  Feeling bad about yourself - or that you are a failure or have let yourself or your family down: 0 - not at all  Trouble concentrating on things, such as reading the newspaper or watching television: 0 - not at all  Moving or speaking so slowly that other people could have noticed   Or the opposite - being so fidgety or restless that you have been moving around a lot more than usual: 0 - not at all  Thoughts that you would be better off dead, or of hurting yourself in some way: 0 - not at all  PHQ-9 Score: 2   PHQ-9 Interpretation: No or Minimal depression                    NENA-7 Flowsheet Screening    Flowsheet Row Most Recent Value   Over the last 2 weeks, how often have you been bothered by any of the following problems?     Feeling nervous, anxious, or on edge 1   Not being able to stop or control worrying 0   Worrying too much about different things 0   Trouble relaxing 1   Being so restless that it is hard to sit still 1   Becoming easily annoyed or irritable 0   Feeling afraid as if something awful might happen 0   NENA-7 Total Score 3          MDQ:  0, Asynchronous, No Problem     Vitamin D Deficiency - On Rx Vitamin D dose unknown per [place]  Not taking MVI        AR - Stable on Claritin PRN        B/L Tinnitus - Previously seen by ENT  F/U PRN  He never got hearing aids  He wears ear protection when hunting        Right Middle Finger OA - Can't flex finger  Declines Ortho Hand consult 10/12/22, but may reconsider in future        H/O Colon Polyps - Last colonoscopy 6/30/21, next due 6/29/26      Cigar Smoker / Chews Tobacco - Contemplative        Reviewed:  Labs 8/22/22     No Uro  Has PSA done yearly by [place]  Matthew LENARD            Sees Dentist q6 months  Sees Optho q2 years  The following portions of the patient's history were reviewed and updated as appropriate: allergies, current medications, past family history, past medical history, past social history, past surgical history and problem list       Review of Systems   Constitutional: Positive for fatigue  Negative for appetite change, chills, diaphoresis and fever  Respiratory: Negative for chest tightness and shortness of breath  Cardiovascular: Negative for chest pain  Gastrointestinal: Negative for abdominal pain, blood in stool, diarrhea, nausea and vomiting  Genitourinary: Negative for dysuria  Current Outpatient Medications   Medication Sig Dispense Refill   • busPIRone (BUSPAR) 7 5 mg tablet 1 pill by mouth twice daily, with 3rd dose once as needed for anxiety  90 tablet 1   • Cholecalciferol 50 MCG (2000 UT) TABS Take 1 tablet by mouth daily [facility]     • clonazePAM (KlonoPIN) 0 5 mg tablet 1/2 tab by mouth in AM, 1/2 tab by mouth at night   60 tablet 2   • DULoxetine (CYMBALTA) 60 mg delayed release capsule Take 1 capsule (60 mg total) by mouth daily 30 capsule 8   • famotidine (PEPCID) 20 mg tablet Take 20 mg by mouth 2 (two) times a day as needed for heartburn OTC     • fenofibrate (TRICOR) 48 mg tablet Take 48 mg by mouth daily Rx per [facility]     • loratadine (CLARITIN) 10 mg tablet Take 1 tablet by mouth as needed     • traZODone (DESYREL) 50 mg tablet 1/2 pill by mouth at night x 3 nights, then if needed, increased to 1 pill by mouth at night thereafter PRN  30 tablet 1     No current facility-administered medications for this visit  Objective:  /70   Pulse 92   Temp (!) 97 4 °F (36 3 °C)   Resp 16   Ht 5' 9" (1 753 m)   Wt 102 kg (223 lb 12 8 oz)   SpO2 97%   BMI 33 05 kg/m²    Wt Readings from Last 3 Encounters:   11/25/22 102 kg (223 lb 12 8 oz)   10/12/22 102 kg (225 lb)   08/22/22 101 kg (222 lb)      BP Readings from Last 3 Encounters:   11/25/22 128/70   10/12/22 124/84   08/22/22 112/82          Physical Exam  Vitals and nursing note reviewed  Constitutional:       Appearance: Normal appearance  He is well-developed and well-nourished  He is obese  HENT:      Head: Normocephalic and atraumatic  Eyes:      Conjunctiva/sclera: Conjunctivae normal    Neck:      Thyroid: No thyromegaly  Cardiovascular:      Rate and Rhythm: Normal rate and regular rhythm  Pulses: Normal pulses and intact distal pulses  Heart sounds: Normal heart sounds  Pulmonary:      Effort: Pulmonary effort is normal       Breath sounds: Normal breath sounds  Musculoskeletal:         General: No swelling, tenderness or edema  Cervical back: Neck supple  Right lower leg: No edema  Left lower leg: No edema  Skin:     Comments: Right temple c white, scaly macule (likely [place]?)   Neurological:      General: No focal deficit present  Mental Status: He is alert and oriented to person, place, and time     Psychiatric:         Mood and Affect: Mood and affect and mood normal          Behavior: Behavior normal          Thought Content: Thought content normal          Judgment: Judgment normal          Lab Results:      Lab Results   Component Value Date    WBC 5 8 08/22/2022    HGB 14 9 08/22/2022    HCT 43 9 08/22/2022     08/22/2022    CHOL 193 09/15/2016    TRIG 363 (H) 08/22/2022    HDL 40 08/22/2022    ALT 49 (H) 08/22/2022    AST 23 08/22/2022     09/15/2016    K 4 2 08/22/2022     08/22/2022    CREATININE 0 92 08/22/2022    BUN 18 08/22/2022    CO2 24 08/22/2022    HGBA1C 5 4 01/20/2021     No results found for: URICACID  Invalid input(s): BASENAME Vitamin D    No results found       POCT Labs

## 2022-11-25 NOTE — ASSESSMENT & PLAN NOTE
Stable  Continue Cymbalta 60 mg daily  Patient declines dose increase to Cymbalta 90 mg daily  Smart phone sebas list and counseling list given previously

## 2022-12-09 ENCOUNTER — TELEPHONE (OUTPATIENT)
Dept: PLASTIC SURGERY | Facility: CLINIC | Age: 62
End: 2022-12-09

## 2022-12-27 ENCOUNTER — TELEPHONE (OUTPATIENT)
Dept: PLASTIC SURGERY | Facility: CLINIC | Age: 62
End: 2022-12-27

## 2023-01-01 LAB
25(OH)D3 SERPL-MCNC: 42 NG/ML (ref 30–100)
ALBUMIN SERPL-MCNC: 4.6 G/DL (ref 3.6–5.1)
ALBUMIN/GLOB SERPL: 1.9 (CALC) (ref 1–2.5)
ALP SERPL-CCNC: 88 U/L (ref 35–144)
ALT SERPL-CCNC: 42 U/L (ref 9–46)
AST SERPL-CCNC: 22 U/L (ref 10–35)
BILIRUB SERPL-MCNC: 0.5 MG/DL (ref 0.2–1.2)
BUN SERPL-MCNC: 18 MG/DL (ref 7–25)
BUN/CREAT SERPL: ABNORMAL (CALC) (ref 6–22)
CALCIUM SERPL-MCNC: 9.6 MG/DL (ref 8.6–10.3)
CHLORIDE SERPL-SCNC: 106 MMOL/L (ref 98–110)
CHOLEST SERPL-MCNC: 193 MG/DL
CHOLEST/HDLC SERPL: 4.2 (CALC)
CO2 SERPL-SCNC: 26 MMOL/L (ref 20–32)
CREAT SERPL-MCNC: 1.04 MG/DL (ref 0.7–1.35)
GFR/BSA.PRED SERPLBLD CYS-BASED-ARV: 81 ML/MIN/1.73M2
GLOBULIN SER CALC-MCNC: 2.4 G/DL (CALC) (ref 1.9–3.7)
GLUCOSE SERPL-MCNC: 106 MG/DL (ref 65–99)
HBA1C MFR BLD: 5.5 % OF TOTAL HGB
HDLC SERPL-MCNC: 46 MG/DL
LDLC SERPL CALC-MCNC: 122 MG/DL (CALC)
LDLC SERPL DIRECT ASSAY-MCNC: 121 MG/DL
NONHDLC SERPL-MCNC: 147 MG/DL (CALC)
POTASSIUM SERPL-SCNC: 4.5 MMOL/L (ref 3.5–5.3)
PROT SERPL-MCNC: 7 G/DL (ref 6.1–8.1)
SODIUM SERPL-SCNC: 139 MMOL/L (ref 135–146)
TRIGL SERPL-MCNC: 131 MG/DL
TSH SERPL-ACNC: 1.22 MIU/L (ref 0.4–4.5)

## 2023-01-03 ENCOUNTER — RA CDI HCC (OUTPATIENT)
Dept: OTHER | Facility: HOSPITAL | Age: 63
End: 2023-01-03

## 2023-01-03 NOTE — PROGRESS NOTES
[facility]  coding opportunities       Chart reviewed, no opportunity found: CHART REVIEWED, NO OPPORTUNITY FOUND        Patients Insurance        Commercial Insurance: [employer]

## 2023-01-03 NOTE — RESULT ENCOUNTER NOTE
Unstable labs - will review with patient at upcoming appointment  Hyperlipidemia - To consider statin?       The 10-year ASCVD risk score (Layne HAGER, et al , 2019) is: 10 4%    Values used to calculate the score:      Age: 58 years      Sex: Male      Is Non- : No      Diabetic: No      Tobacco smoker: No      Systolic Blood Pressure: 161 mmHg      Is BP treated: No      HDL Cholesterol: 46 mg/dL      Total Cholesterol: 193 mg/dL

## 2023-01-09 ENCOUNTER — OFFICE VISIT (OUTPATIENT)
Dept: FAMILY MEDICINE CLINIC | Facility: CLINIC | Age: 63
End: 2023-01-09

## 2023-01-09 VITALS
SYSTOLIC BLOOD PRESSURE: 128 MMHG | TEMPERATURE: 96.8 F | DIASTOLIC BLOOD PRESSURE: 80 MMHG | HEART RATE: 82 BPM | BODY MASS INDEX: 33.21 KG/M2 | HEIGHT: 69 IN | WEIGHT: 224.2 LBS | RESPIRATION RATE: 14 BRPM | OXYGEN SATURATION: 97 %

## 2023-01-09 DIAGNOSIS — G47.33 SLEEP APNEA, OBSTRUCTIVE: ICD-10-CM

## 2023-01-09 DIAGNOSIS — H53.9 VISION CHANGES: ICD-10-CM

## 2023-01-09 DIAGNOSIS — E78.1 ESSENTIAL HYPERTRIGLYCERIDEMIA: ICD-10-CM

## 2023-01-09 DIAGNOSIS — F33.42 RECURRENT MAJOR DEPRESSIVE DISORDER, IN FULL REMISSION (HCC): ICD-10-CM

## 2023-01-09 DIAGNOSIS — E78.2 MIXED HYPERLIPIDEMIA: ICD-10-CM

## 2023-01-09 DIAGNOSIS — E55.9 VITAMIN D DEFICIENCY: ICD-10-CM

## 2023-01-09 DIAGNOSIS — Z72.0 CHEWING TOBACCO USE: ICD-10-CM

## 2023-01-09 DIAGNOSIS — E66.9 CLASS 1 OBESITY WITH SERIOUS COMORBIDITY AND BODY MASS INDEX (BMI) OF 33.0 TO 33.9 IN ADULT, UNSPECIFIED OBESITY TYPE: ICD-10-CM

## 2023-01-09 DIAGNOSIS — Z23 ENCOUNTER FOR IMMUNIZATION: Primary | ICD-10-CM

## 2023-01-09 DIAGNOSIS — F41.9 ANXIETY: ICD-10-CM

## 2023-01-09 DIAGNOSIS — G47.00 INSOMNIA, UNSPECIFIED TYPE: ICD-10-CM

## 2023-01-09 RX ORDER — TRAZODONE HYDROCHLORIDE 50 MG/1
TABLET ORAL
Qty: 90 TABLET | Refills: 2 | Status: SHIPPED | OUTPATIENT
Start: 2023-01-09

## 2023-01-09 RX ORDER — ATORVASTATIN CALCIUM 20 MG/1
20 TABLET, FILM COATED ORAL
Qty: 30 TABLET | Refills: 8 | Status: SHIPPED | OUTPATIENT
Start: 2023-01-09

## 2023-01-09 RX ORDER — BUSPIRONE HYDROCHLORIDE 7.5 MG/1
TABLET ORAL
Qty: 270 TABLET | Refills: 2 | Status: SHIPPED | OUTPATIENT
Start: 2023-01-09

## 2023-01-09 NOTE — PROGRESS NOTES
Assessment/Plan:  Problem List Items Addressed This Visit        Respiratory    Sleep apnea, obstructive     Management per Sleep Medicine  Continue CPAP  Uncontrolled TIMA may be contributing to insomnia  Long-term side effects of benzodiazepines discussed previously  Continue Weaning Klonopin 0 5m/4 pill by mouth in AM, then 1/4 pill by mouth at bedtime  When you return from your trip, wean Klonopin 0 5mg 1/4 pill at bedtime x 2 weeks, then stop  Patient states he currently has 30 pills of Klonopin prescription available  CSA signed 10/12/22, RUDS done 10/12/22  Other    Depression     Stable  Continue Cymbalta 60 mg daily  Patient declines dose increase to Cymbalta 90 mg daily  Smart phone sebas list and counseling list given previously  Relevant Medications    busPIRone (BUSPAR) 7 5 mg tablet    traZODone (DESYREL) 50 mg tablet    Other Relevant Orders    TSH, 3rd generation with Free T4 reflex    Anxiety     Stable  Continue Cymbalta 60 mg daily and Buspar 7 5mg TID PRN - patient will take 3rd dose 1 hour prior to bedtime to see if racing thoughts improve  Patient declines dose increase to Cymbalta 90 mg daily and declines Buspar dose increease  Smart phone sebas list and counseling list given previously  Long-term side effects of benzodiazepines discussed previously  Continue Weaning Klonopin 0 5m/4 pill by mouth in AM, then 1/4 pill by mouth at bedtime  When you return from your trip, wean Klonopin 0 5mg 1/4 pill at bedtime x 2 weeks, then stop  Patient states he currently has 30 pills of Klonopin prescription available  CSA signed 10/12/22, RUDS done 10/12/22  Relevant Medications    busPIRone (BUSPAR) 7 5 mg tablet    Other Relevant Orders    TSH, 3rd generation with Free T4 reflex    Mixed hyperlipidemia     Uncontrolled  Start Lipitor 20mg QHS    [facility]ate [facility]D [facility] - patient declines dose increase today, but may need in future  Recommend lifestyle modifications  The 10-year ASCVD risk score (Layne HAGER, et al , 2019) is: 10 4%    Values used to calculate the score:      Age: 58 years      Sex: Male      Is Non- : No      Diabetic: No      Tobacco smoker: No      Systolic Blood Pressure: 648 mmHg      Is BP treated: No      HDL Cholesterol: 46 mg/dL      Total Cholesterol: 193 mg/dL           Relevant Medications    atorvastatin (LIPITOR) 20 mg tablet    Other Relevant Orders    Lipid panel    LDL cholesterol, direct    Obesity     Stable  Recommend lifestyle modifications  Relevant Orders    TSH, 3rd generation with Free T4 reflex    Chewing tobacco use     Contemplative  Chewing tobacco cessation advised  Insomnia     Improved  Continue Trazodone 50mg QHS PRN - consider dose increase in future PRN? Uncontrolled TIMA may be contributing  Continue Cymbalta 60 mg daily and Buspar 7 5mg TID PRN - patient will take 3rd dose 1 hour prior to bedtime to see if racing thoughts improve  Patient declines dose increase to Cymbalta 90 mg daily and declines Buspar dose increease  Smart phone sebas list and counseling list given previously  Long-term side effects of benzodiazepines discussed previously  Continue Weaning Klonopin 0 5m/4 pill by mouth in AM, then 1/4 pill by mouth at bedtime  When you return from your trip, wean Klonopin 0 5mg 1/4 pill at bedtime x 2 weeks, then stop  Patient states he currently has 30 pills of Klonopin prescription available  CSA signed 10/12/22, RUDS done 10/12/22  Relevant Medications    traZODone (DESYREL) 50 mg tablet    Other Relevant Orders    TSH, 3rd generation with Free T4 reflex    Essential hypertriglyceridemia     Continue Fenofibrate 48mg QD per [facility] - patient declines dose increased today, but may need in future  Recommend lifestyle modifications           Relevant Medications    atorvastatin (LIPITOR) 20 mg tablet    Other Relevant Orders    Lipid panel    LDL cholesterol, direct    Vitamin D deficiency     Stable  Continue vitamin-D per [facility]-dose unknown  Start multivitamin  Relevant Orders    Comprehensive metabolic panel    Vitamin D 25 hydroxy   Other Visit Diagnoses     Encounter for immunization    -  Primary    Vision changes        Patient to F/U c Optho  Return in 4 months (on 5/9/2023) for 40min - 4mo HL, Anx/Dep, Insomnia, Labs; 1mo - HepB#2, 6mo- HepB#3  Future Appointments   Date Time Provider Bradford Finn   2/27/2023  9:00 AM [facility]   5/10/2023  8:20 AM Berta Silverman DO [facility]   7/31/2023  9:00 AM [facility]        Subjective:     Beatrice Child is a 58 y o  male who presents today for a follow-up on his chronic medical conditions  HPI:  Chief Complaint   Patient presents with   • Follow-up     Mood f/u  No new problems or concerns at this time  Labs done 12/31  • HM     Hep B vaccine pending, would like to discuss  -- Above per clinical staff and reviewed  --      HPI      Today:    Return in 6 weeks (on 1/6/2023) for 40min - F/U HL, Anx/Dep, Labs; PRN Hep B#1, 1mo - HepB#2, 6mo- HepB#3        Will be gone on a hunting trip 1/13/22 - 2/19/23         [facility] Dr Froilan Taylor - will be getting a new [place] doctor - Next due 2/21/23 and 2/22/23 for labs and physical   [facility] PSA [facility] by [facility]   Declines LENARD        Right Eye Visual Change - Symptoms x several months  Sees spots of light in his vision, that becomes a horseshoe shape, then disappears  Denies loss of vision  Lasts for 5 minutes, then self-resolves  Pending Optho appt c Dr Lily Granado in [place], [place]  Patient will call to see if he needs to be seen sooner          Obesity - Watching diet   No Exercise - Previously Rides Motorcross bike for 30 minutes, 2 times per week   Previously ran         Hyperlipidemia / Hypertriglyceridemia - On Fenofibrate 48mg QD per [facility] since    No other cholesterol Rx previously          GERD - Stable on Pepcid OTC PRN   Watching GERD diet   No EGD previously           TIMA - [facility]   Next appt    Using CPAP nightly          Anxiety / Depression / Insomnia - On Trazodone 50mg QHS PRN x 6 weeks - using QHS, but has difficulty falling asleep - 60 minutes, but then can stay asleep throughout the night  On Buspar 7 5mg TID PRN - He has been taking a 3rd dose daily at bedtime      Previously he was able to fall asleep early, sleeps for 3-4 hours, then awakes at 1:00am, awake from 1 5 hours, falls asleep, awakens, falls asleep, awakens, but sometimes does not feel well rested in AM   Sleeping for a total for 7-8 (previously 5) hours per night  Drinks caffeine 10oz (previously 16oz)  daily            Weaning Klonopin 0 5mg BID - last Rx 22 by prior PCP      Continue Weaning Klonopin 0 5m/2 pill by mouth in AM, then 1/2 pill by mouth at bedtime x  2 more week for a total of 1 month  Patient will call with update in 2 weeks  If insomnia is improved, will plan to then decrease to 1/2 pill at bedtime x 2 weeks, then 1/2 pill at bedtime every other night x 2 weeks, then stop  He is currently taking 1/4 pill in AM and and 1/4 pill QHS for the past 2 weeks    He states he has 30 Klonopin pills remaining at home        On Cymbalta 60mg QD   Poor social supports   No SI/HI/AH/VH  [facility] counseling   Previously on Ativan   No higher doses or other mood meds previously        PHQ-2/9 Depression Screening       Little interest or pleasure in doing things: 0 - not at all  Feeling down, depressed, or hopeless: 0 - not at all  Trouble falling or staying asleep, or sleeping too much: 3 - nearly every day  Feeling tired or having little energy: 1 - several days  Poor appetite or overeatin - not at all  Feeling bad about yourself - or that you are a failure or have let yourself or your family down: 0 - not at all  Trouble concentrating on things, such as reading the newspaper or watching television: 0 - not at all  Moving or speaking so slowly that other people could have noticed  Or the opposite - being so fidgety or restless that you have been moving around a lot more than usual: 0 - not at all  Thoughts that you would be better off dead, or of hurting yourself in some way: 0 - not at all  PHQ-9 Score: 4   PHQ-9 Interpretation: No or Minimal depression                    NENA-7 Flowsheet Screening    Flowsheet Row Most Recent Value   Over the last 2 weeks, how often have you been bothered by any of the following problems?     Feeling nervous, anxious, or on edge 1   Not being able to stop or control worrying 1   Worrying too much about different things 0   Trouble relaxing 1   Being so restless that it is hard to sit still 1   Becoming easily annoyed or irritable 0   Feeling afraid as if something awful might happen 0   NENA-7 Total Score 4          PHQ-2/9 Depression Screening    Little interest or pleasure in doing things: 0 - not at all  Feeling down, depressed, or hopeless: 0 - not at all  Trouble falling or staying asleep, or sleeping too much: 3 - nearly every day  Feeling tired or having little energy: 0 - not at all  Poor appetite or overeatin - not at all  Feeling bad about yourself - or that you are a failure or have let yourself or your family down: 0 - not at all  Trouble concentrating on things, such as reading the newspaper or watching television: 3 - nearly every day  Moving or speaking so slowly that other people could have noticed   Or the opposite - being so fidgety or restless that you have been moving around a lot more than usual: 0 - not at all  Thoughts that you would be better off dead, or of hurting yourself in some way: 0 - not at all  PHQ-9 Score: 6   PHQ-9 Interpretation: Mild depression          NENA-7 Flowsheet Screening    Flowsheet Row Most Recent Value   Over the last 2 weeks, how often have you been bothered by any of the following problems? Feeling nervous, anxious, or on edge 1   Not being able to stop or control worrying 0   Worrying too much about different things 0   Trouble relaxing 1   Being so restless that it is hard to sit still 0   Becoming easily annoyed or irritable 0   Feeling afraid as if something awful might happen 0   NENA-7 Total Score 2             Vitamin D Deficiency - On Rx Vitamin D dose unknown per [facility]   Not taking MVI        AR - Stable on Claritin PRN        B/L Tinnitus - Previously seen by ENT   F/U PRN  Marley [place] never got hearing aids  Marley Angeles wears ear protection when hunting        Right Middle Finger OA - Can't flex finger   Declines Ortho Hand consult 10/12/22, but may reconsider in future       AK - Management per Derm Dr Ramses Szymanski   Next appt 12/23      Chews Tobacco - Contemplative          From previous note:    Return in about 6 weeks (around 11/23/2022) for 40min - F/U HL, Anx/Dep, Labs      Will be gone 1/13/22 - 2/19/23         [facility] Dr Mariza Dougherty - Next due 2/21/23 and 2/22/23 for labs and physical   Has PSA done [facility] by [facility]   Declines LENARD         Patient did not complete Labs 10/12/22       Right Temple Itchy Lesion - Symptoms x 2 months  Using OTC Hydrocortisone cream c benefit         Obesity - Watching diet   No Exercise - Previously Rides Motorcross bike for 30 minutes, 2 times per week   Previously ran         Hyperlipidemia / Hypertriglyceridemia - On Fenofibrate 48mg QD per [facility] since 2/22   No other cholesterol Rx previously          GERD - Stable on Pepcid OTC PRN   Watching GERD diet   No EGD previously           TIMA - [facility]   Next appt 5/23   Using CPAP nightly          Anxiety / Depression - Stable    On Buspar 7 5mg TID PRN x 6 weeks - He has not been taking a 3rd dose for 1 week, sometimes taking 1/2 of 7 5mg pill for no particular reason, able to take full 3rd dose s issue  He was taking 3rd dose to experiment, but not due to anxiety  He states he his sleep has worsened when he reduced his bedtime dose (which he is taking 4-5pm because he was having anxiety then, but no longer with Buspar instead of QHS) of Klonopin form 0 5mg 1 pill to 1/2 pill at bedtime  He is able to fall asleep early, sleeps for 3-4 hours, then awakes at 1:00am, awake fro 1 5 hours, falls asleep, awakens, falls asleep, awakens, but sometimes does not feel well rested in AM   Sleeping for a total for 5 hours per night  Drinks 16oz daily            Weaning Klonopin 0 5mg BID - last Rx 22 by prior PCP - last dose 6:00am     Wean Klonopin 0 5m/2 pill by mouth in AM, then 1 pill by mouth at bedtime x 1 month, then 1/2 pill by mouth in AM and 1/2 pill at bedtime x 2 weeks      On Cymbalta 60mg QD   Good social supports   No SI/HI/AH/VH  [facility] counseling   Previously on Ativan   No higher doses or other mood meds previously        PHQ-2/9 Depression Screening       Little interest or pleasure in doing things: 0 - not at all  Feeling down, depressed, or hopeless: 0 - not at all  Trouble falling or staying asleep, or sleeping too much: 3 - nearly every day  Feeling tired or having little energy: 1 - several days  Poor appetite or overeatin - not at all  Feeling bad about yourself - or that you are a failure or have let yourself or your family down: 0 - not at all  Trouble concentrating on things, such as reading the newspaper or watching television: 0 - not at all  Moving or speaking so slowly that other people could have noticed   Or the opposite - being so fidgety or restless that you have been moving around a lot more than usual: 0 - not at all  Thoughts that you would be better off dead, or of hurting yourself in some way: 0 - not at all  PHQ-9 Score: 4   PHQ-9 Interpretation: No or Minimal depression                    NENA-7 Flowsheet Screening    Flowsheet Row Most Recent Value   Over the last 2 weeks, how often have you been bothered by any of the following problems?     Feeling nervous, anxious, or on edge 1   Not being able to stop or control worrying 1   Worrying too much about different things 0   Trouble relaxing 1   Being so restless that it is hard to sit still 1   Becoming easily annoyed or irritable 0   Feeling afraid as if something awful might happen 0   NENA-7 Total Score 4                Vitamin D Deficiency - On Rx Vitamin D dose unknown per [facility]   Not taking MVI        AR - Stable on Claritin PRN        B/L Tinnitus - Previously seen by ENT   F/U PRN  [facility] never got hearing aids  [facility] wears ear protection when hunting        Right Middle Finger OA - Can't flex finger   Declines Ortho Hand consult 10/12/22, but may reconsider in future        Chews Tobacco - Contemplative                   From previous note:     Controlled Substance Review     [place]MP or [place]  reviewed: No red flags were identified; safe to proceed with prescription          [facility] Dr Dale Nieves - Next due 12/22   Has PSA done yearly by [facility]          Obesity - Not watching diet   +Exercise - Rides Motorcross bike for 30 minutes, 2 times per week   Previously ran         Hyperlipidemia / Hypertriglyceridemia - On Fenofibrate 48mg QD per [facility] since 2/22   No other cholesterol Rx previously          GERD - Stable on Pepcid OTC PRN   Watching GERD diet   No EGD previously           TIMA - [facility]   Next appt 5/23   Using CPAP nightly          Anxiety / Depression - Stable   On Klonopin 0 5mg BID - last Rx 9/12/22 by prior PCP - last dose 6:00am, On Cymbalta 60mg QD   Good social supports   No SI/HI/AH/VH  [facility] counseling   Previously on Ativan   No higher doses or other mood meds previously           PHQ-2/9 Depression Screening       Little interest or pleasure in doing things: 0 - not at all  Feeling down, depressed, or hopeless: 1 - several days  Trouble falling or staying asleep, or sleeping too much: 0 - not at all  Feeling tired or having little energy: 1 - several days  Poor appetite or overeatin - not at all  Feeling bad about yourself - or that you are a failure or have let yourself or your family down: 0 - not at all  Trouble concentrating on things, such as reading the newspaper or watching television: 0 - not at all  Moving or speaking so slowly that other people could have noticed  Or the opposite - being so fidgety or restless that you have been moving around a lot more than usual: 0 - not at all  Thoughts that you would be better off dead, or of hurting yourself in some way: 0 - not at all  PHQ-9 Score: 2   PHQ-9 Interpretation: No or Minimal depression                      NENA-7 Flowsheet Screening    Flowsheet Row Most Recent Value   Over the last 2 weeks, how often have you been bothered by any of the following problems?     Feeling nervous, anxious, or on edge 1   Not being able to stop or control worrying 0   Worrying too much about different things 0   Trouble relaxing 1   Being so restless that it is hard to sit still 1   Becoming easily annoyed or irritable 0   Feeling afraid as if something awful might happen 0   NENA-7 Total Score 3          MDQ:  0, Asynchronous, No Problem     Vitamin D Deficiency - On Rx Vitamin D dose unknown per [facility]   Not taking MVI        AR - Stable on Claritin PRN        B/L Tinnitus - Previously seen by ENT   F/U PRN  [facility] never got hearing aids  Bhumika Randolph wears ear protection when hunting        Right Middle Finger OA - Can't flex finger   Declines Ortho Hand consult 10/12/22, but may reconsider in future        H/O Colon Polyps - Last colonoscopy 21, next due 26      Cigar Smoker / Chews Tobacco - Contemplative        Reviewed:  Labs , Derm 22     No Uro   [facility] by [facility]   Declines LENARD             Sees Dentist q6 months  Sees Optho q2 years             The following portions of the patient's history were reviewed and updated as appropriate: allergies, current medications, past family history, past medical history, past social history, past surgical history and problem list       Review of Systems   Constitutional: Negative for appetite change, chills, diaphoresis, fatigue and fever  Respiratory: Negative for chest tightness and shortness of breath  Cardiovascular: Negative for chest pain  Gastrointestinal: Negative for abdominal pain, blood in stool, diarrhea, nausea and vomiting  Genitourinary: Negative for dysuria  Current Outpatient Medications   Medication Sig Dispense Refill   • atorvastatin (LIPITOR) 20 mg tablet Take 1 tablet (20 mg total) by mouth daily at bedtime 30 tablet 8   • busPIRone (BUSPAR) 7 5 mg tablet 1 pill by mouth twice daily, with 3rd dose once as needed for anxiety  270 tablet 2   • Cholecalciferol 50 MCG (2000 UT) TABS Take 1 tablet by mouth daily Rx per [facility]     • clonazePAM (KlonoPIN) 0 5 mg tablet 1/2 tab by mouth in AM, 1/2 tab by mouth at night  (Patient taking differently: 1/4 tab by mouth in AM, 1/4 tab by mouth at night ) 60 tablet 2   • DULoxetine (CYMBALTA) 60 mg delayed release capsule Take 1 capsule (60 mg total) by mouth daily 30 capsule 8   • famotidine (PEPCID) 20 mg tablet Take 20 mg by mouth 2 (two) times a day as needed for heartburn OTC     • fenofibrate (TRICOR) 48 mg tablet Take 48 mg by mouth daily Rx per [facility]     • loratadine (CLARITIN) 10 mg tablet Take 1 tablet by mouth as needed     • traZODone (DESYREL) 50 mg tablet 1 pill by mouth at night PRN  90 tablet 2     No current facility-administered medications for this visit         Objective:  /80   Pulse 82   Temp (!) 96 8 °F (36 °C)   Resp 14   Ht 5' 9" (1 753 m)   Wt 102 kg (224 lb 3 2 oz)   SpO2 97%   BMI 33 11 kg/m²    Wt Readings from Last 3 Encounters:   01/09/23 102 kg (224 lb 3 2 oz)   11/25/22 102 kg (223 lb 12 8 oz)   10/12/22 102 kg (225 lb)      BP Readings from Last 3 Encounters:   01/09/23 128/80   11/25/22 128/70   10/12/22 124/84          Physical Exam  Vitals and nursing note reviewed  Constitutional:       Appearance: Normal appearance  He is well-developed  He is obese  HENT:      Head: Normocephalic and atraumatic  Eyes:      Extraocular Movements: Extraocular movements intact  Conjunctiva/sclera: Conjunctivae normal       Pupils: Pupils are equal, round, and reactive to light  Neck:      Thyroid: No thyromegaly  Cardiovascular:      Rate and Rhythm: Normal rate and regular rhythm  Pulses: Normal pulses  Heart sounds: Normal heart sounds  Pulmonary:      Effort: Pulmonary effort is normal       Breath sounds: Normal breath sounds  Abdominal:      General: Bowel sounds are normal  There is no distension  Palpations: Abdomen is soft  There is no mass  Tenderness: There is no abdominal tenderness  There is no guarding or rebound  Musculoskeletal:         General: No swelling or tenderness  Cervical back: Neck supple  Right lower leg: No edema  Left lower leg: No edema  Neurological:      General: No focal deficit present  Mental Status: He is alert and oriented to person, place, and time  Psychiatric:         Mood and Affect: Mood normal          Behavior: Behavior normal          Thought Content:  Thought content normal          Judgment: Judgment normal          Lab Results:      Lab Results   Component Value Date    WBC 5 8 08/22/2022    HGB 14 9 08/22/2022    HCT 43 9 08/22/2022     08/22/2022    CHOL 193 09/15/2016    TRIG 131 12/31/2022    HDL 46 12/31/2022    LDLDIRECT 121 (H) 12/31/2022    ALT 42 12/31/2022    AST 22 12/31/2022     09/15/2016    K 4 5 12/31/2022     12/31/2022    CREATININE 1 04 12/31/2022    BUN 18 12/31/2022    CO2 26 12/31/2022    HGBA1C 5 5 12/31/2022     No results found for: URICACID  Invalid input(s): BASENAME Vitamin D    No results found       POCT Labs

## 2023-01-09 NOTE — PATIENT INSTRUCTIONS
When you return from your trip, wean Klonopin 0 5mg 1/4 pill at bedtime x 2 weeks, then stop  Take 3rd dose Buspar 7 5mg 1 hour prior to bedtime

## 2023-01-12 NOTE — ASSESSMENT & PLAN NOTE
Stable  Continue Cymbalta 60 mg daily  Patient declines dose increase to Cymbalta 90 mg daily  Smart phone seabs list and counseling list given previously

## 2023-01-12 NOTE — ASSESSMENT & PLAN NOTE
Stable  Continue Cymbalta 60 mg daily and Buspar 7 5mg TID PRN - patient will take 3rd dose 1 hour prior to bedtime to see if racing thoughts improve  Patient declines dose increase to Cymbalta 90 mg daily and declines Buspar dose increease  Smart phone sebas list and counseling list given previously  Long-term side effects of benzodiazepines discussed previously  Continue Weaning Klonopin 0 5m/4 pill by mouth in AM, then 1/4 pill by mouth at bedtime  When you return from your trip, wean Klonopin 0 5mg 1/4 pill at bedtime x 2 weeks, then stop  Patient states he currently has 30 pills of Klonopin prescription available  CSA signed 10/12/22, GILBERT done 10/12/22

## 2023-01-12 NOTE — ASSESSMENT & PLAN NOTE
Management per Sleep Medicine  Continue CPAP  Uncontrolled TIMA may be contributing to insomnia  Long-term side effects of benzodiazepines discussed previously  Continue Weaning Klonopin 0 5m/4 pill by mouth in AM, then 1/4 pill by mouth at bedtime  When you return from your trip, wean Klonopin 0 5mg 1/4 pill at bedtime x 2 weeks, then stop  Patient states he currently has 30 pills of Klonopin prescription available  CSA signed 10/12/22, GILBERT done 10/12/22

## 2023-01-12 NOTE — ASSESSMENT & PLAN NOTE
Uncontrolled  Start Lipitor 20mg QHS  Continue Fenofibrate 48mg QD [facility] - patient declines dose increase today, but may need in future  Recommend lifestyle modifications      The 10-year ASCVD risk score (Layne HAGER, et al , 2019) is: 10 4%    Values used to calculate the score:      Age: 58 years      Sex: Male      Is Non- : No      Diabetic: No      Tobacco smoker: No      Systolic Blood Pressure: 054 mmHg      Is BP treated: No      HDL Cholesterol: 46 mg/dL      Total Cholesterol: 193 mg/dL

## 2023-01-12 NOTE — ASSESSMENT & PLAN NOTE
Improved  Continue Trazodone 50mg QHS PRN - consider dose increase in future PRN? Uncontrolled TIMA may be contributing  Continue Cymbalta 60 mg daily and Buspar 7 5mg TID PRN - patient will take 3rd dose 1 hour prior to bedtime to see if racing thoughts improve  Patient declines dose increase to Cymbalta 90 mg daily and declines Buspar dose increease  Smart phone sebas list and counseling list given previously  Long-term side effects of benzodiazepines discussed previously  Continue Weaning Klonopin 0 5m/4 pill by mouth in AM, then 1/4 pill by mouth at bedtime  When you return from your trip, wean Klonopin 0 5mg 1/4 pill at bedtime x 2 weeks, then stop  Patient states he currently has 30 pills of Klonopin prescription available  CSA signed 10/12/22, GILBERT done 10/12/22

## 2023-01-13 ENCOUNTER — TELEPHONE (OUTPATIENT)
Dept: PLASTIC SURGERY | Facility: CLINIC | Age: 63
End: 2023-01-13

## 2023-01-13 NOTE — TELEPHONE ENCOUNTER
Left message to see if patient would like to schedule an appt with us regarding the referral we have

## 2023-02-27 ENCOUNTER — CLINICAL SUPPORT (OUTPATIENT)
Dept: FAMILY MEDICINE CLINIC | Facility: CLINIC | Age: 63
End: 2023-02-27

## 2023-02-27 DIAGNOSIS — Z23 ENCOUNTER FOR IMMUNIZATION: ICD-10-CM
